# Patient Record
Sex: MALE | Race: ASIAN | NOT HISPANIC OR LATINO | Employment: UNEMPLOYED | URBAN - METROPOLITAN AREA
[De-identification: names, ages, dates, MRNs, and addresses within clinical notes are randomized per-mention and may not be internally consistent; named-entity substitution may affect disease eponyms.]

---

## 2017-07-18 ENCOUNTER — OFFICE VISIT - HEALTHEAST (OUTPATIENT)
Dept: FAMILY MEDICINE | Facility: CLINIC | Age: 52
End: 2017-07-18

## 2017-07-18 DIAGNOSIS — Z12.5 SCREENING FOR PROSTATE CANCER: ICD-10-CM

## 2017-07-18 DIAGNOSIS — Z13.0 SCREENING FOR DEFICIENCY ANEMIA: ICD-10-CM

## 2017-07-18 DIAGNOSIS — Z12.11 SCREEN FOR COLON CANCER: ICD-10-CM

## 2017-07-18 DIAGNOSIS — Z13.21 ENCOUNTER FOR VITAMIN DEFICIENCY SCREENING: ICD-10-CM

## 2017-07-18 DIAGNOSIS — Z00.00 WELL ADULT EXAM: ICD-10-CM

## 2017-07-18 DIAGNOSIS — E78.1 PURE HYPERGLYCERIDEMIA: ICD-10-CM

## 2017-07-18 DIAGNOSIS — M76.60 ACHILLES TENDONITIS: ICD-10-CM

## 2017-07-18 LAB — PSA SERPL-MCNC: 0.4 NG/ML (ref 0–3.5)

## 2017-07-19 ENCOUNTER — OFFICE VISIT - HEALTHEAST (OUTPATIENT)
Dept: FAMILY MEDICINE | Facility: CLINIC | Age: 52
End: 2017-07-19

## 2017-07-19 DIAGNOSIS — Z02.4 ENCOUNTER FOR CDL (COMMERCIAL DRIVING LICENSE) EXAM: ICD-10-CM

## 2017-07-19 ASSESSMENT — MIFFLIN-ST. JEOR: SCORE: 1601.96

## 2017-07-24 ENCOUNTER — AMBULATORY - HEALTHEAST (OUTPATIENT)
Dept: FAMILY MEDICINE | Facility: CLINIC | Age: 52
End: 2017-07-24

## 2017-07-24 DIAGNOSIS — E78.5 DYSLIPIDEMIA: ICD-10-CM

## 2017-07-25 ENCOUNTER — COMMUNICATION - HEALTHEAST (OUTPATIENT)
Dept: FAMILY MEDICINE | Facility: CLINIC | Age: 52
End: 2017-07-25

## 2017-08-01 ENCOUNTER — COMMUNICATION - HEALTHEAST (OUTPATIENT)
Dept: FAMILY MEDICINE | Facility: CLINIC | Age: 52
End: 2017-08-01

## 2017-08-01 ENCOUNTER — AMBULATORY - HEALTHEAST (OUTPATIENT)
Dept: FAMILY MEDICINE | Facility: CLINIC | Age: 52
End: 2017-08-01

## 2017-08-01 DIAGNOSIS — E78.5 DYSLIPIDEMIA: ICD-10-CM

## 2017-08-22 ENCOUNTER — OFFICE VISIT - HEALTHEAST (OUTPATIENT)
Dept: CARDIOLOGY | Facility: CLINIC | Age: 52
End: 2017-08-22

## 2017-08-22 DIAGNOSIS — E78.1 PURE HYPERGLYCERIDEMIA: ICD-10-CM

## 2017-08-22 ASSESSMENT — MIFFLIN-ST. JEOR: SCORE: 1601.96

## 2017-08-25 ENCOUNTER — HOSPITAL ENCOUNTER (OUTPATIENT)
Dept: CARDIOLOGY | Facility: HOSPITAL | Age: 52
Discharge: HOME | End: 2017-08-25
Attending: INTERNAL MEDICINE

## 2017-08-25 DIAGNOSIS — E78.1 PURE HYPERGLYCERIDEMIA: ICD-10-CM

## 2017-08-25 LAB
CV STRESS CURRENT BP HE: NORMAL
CV STRESS CURRENT HR HE: 100
CV STRESS CURRENT HR HE: 101
CV STRESS CURRENT HR HE: 103
CV STRESS CURRENT HR HE: 104
CV STRESS CURRENT HR HE: 106
CV STRESS CURRENT HR HE: 109
CV STRESS CURRENT HR HE: 112
CV STRESS CURRENT HR HE: 114
CV STRESS CURRENT HR HE: 122
CV STRESS CURRENT HR HE: 123
CV STRESS CURRENT HR HE: 125
CV STRESS CURRENT HR HE: 126
CV STRESS CURRENT HR HE: 132
CV STRESS CURRENT HR HE: 133
CV STRESS CURRENT HR HE: 147
CV STRESS CURRENT HR HE: 147
CV STRESS CURRENT HR HE: 149
CV STRESS CURRENT HR HE: 152
CV STRESS CURRENT HR HE: 157
CV STRESS CURRENT HR HE: 160
CV STRESS CURRENT HR HE: 160
CV STRESS CURRENT HR HE: 161
CV STRESS CURRENT HR HE: 73
CV STRESS CURRENT HR HE: 78
CV STRESS CURRENT HR HE: 99
CV STRESS DEVIATION TIME HE: NORMAL
CV STRESS ECHO PERCENT HR HE: NORMAL
CV STRESS EXERCISE STAGE HE: NORMAL
CV STRESS EXERCISE STAGE REACHED HE: NORMAL
CV STRESS FINAL RESTING BP HE: NORMAL
CV STRESS FINAL RESTING HR HE: 104
CV STRESS MAX HR HE: 163
CV STRESS MAX TREADMILL GRADE HE: 14
CV STRESS MAX TREADMILL SPEED HE: 3.4
CV STRESS PEAK DIA BP HE: NORMAL
CV STRESS PEAK SYS BP HE: NORMAL
CV STRESS PHASE HE: NORMAL
CV STRESS PROTOCOL HE: NORMAL
CV STRESS RESTING PT POSITION HE: NORMAL
CV STRESS RESTING PT POSITION HE: NORMAL
CV STRESS ST DEVIATION AMOUNT HE: NORMAL
CV STRESS ST DEVIATION ELEVATION HE: NORMAL
CV STRESS ST EVELATION AMOUNT HE: NORMAL
CV STRESS TEST TYPE HE: NORMAL
CV STRESS TOTAL STAGE TIME MIN 1 HE: NORMAL
STRESS ECHO BASELINE BP: NORMAL
STRESS ECHO BASELINE HR: 73
STRESS ECHO CALCULATED PERCENT HR: 96 %
STRESS ECHO LAST STRESS BP: NORMAL
STRESS ECHO LAST STRESS HR: 161
STRESS ECHO POST ESTIMATED WORKLOAD: 10.3
STRESS ECHO POST EXERCISE DUR MIN: 8
STRESS ECHO POST EXERCISE DUR SEC: 30
STRESS ECHO TARGET HR: 163.2

## 2018-03-09 ENCOUNTER — OFFICE VISIT - HEALTHEAST (OUTPATIENT)
Dept: FAMILY MEDICINE | Facility: CLINIC | Age: 53
End: 2018-03-09

## 2018-03-09 ENCOUNTER — AMBULATORY - HEALTHEAST (OUTPATIENT)
Dept: FAMILY MEDICINE | Facility: CLINIC | Age: 53
End: 2018-03-09

## 2018-03-09 DIAGNOSIS — J35.1 TONSILLAR HYPERTROPHY: ICD-10-CM

## 2018-03-09 DIAGNOSIS — K21.9 GASTROESOPHAGEAL REFLUX DISEASE WITHOUT ESOPHAGITIS: ICD-10-CM

## 2018-03-09 DIAGNOSIS — Z12.11 SCREEN FOR COLON CANCER: ICD-10-CM

## 2018-03-09 DIAGNOSIS — K14.3 COATED TONGUE: ICD-10-CM

## 2018-03-11 LAB — BACTERIA SPEC CULT: NORMAL

## 2018-03-14 ENCOUNTER — COMMUNICATION - HEALTHEAST (OUTPATIENT)
Dept: FAMILY MEDICINE | Facility: CLINIC | Age: 53
End: 2018-03-14

## 2018-03-14 LAB — BACTERIA SPEC CULT: NORMAL

## 2018-04-13 ENCOUNTER — COMMUNICATION - HEALTHEAST (OUTPATIENT)
Dept: FAMILY MEDICINE | Facility: CLINIC | Age: 53
End: 2018-04-13

## 2018-04-20 ENCOUNTER — OFFICE VISIT - HEALTHEAST (OUTPATIENT)
Dept: FAMILY MEDICINE | Facility: CLINIC | Age: 53
End: 2018-04-20

## 2018-04-20 ENCOUNTER — RECORDS - HEALTHEAST (OUTPATIENT)
Dept: ADMINISTRATIVE | Facility: OTHER | Age: 53
End: 2018-04-20

## 2018-04-20 DIAGNOSIS — R09.81 SINUS CONGESTION: ICD-10-CM

## 2018-04-20 DIAGNOSIS — Z12.11 SCREEN FOR COLON CANCER: ICD-10-CM

## 2018-04-20 DIAGNOSIS — E78.1 PURE HYPERGLYCERIDEMIA: ICD-10-CM

## 2018-05-03 ENCOUNTER — COMMUNICATION - HEALTHEAST (OUTPATIENT)
Dept: FAMILY MEDICINE | Facility: CLINIC | Age: 53
End: 2018-05-03

## 2018-06-12 ENCOUNTER — OFFICE VISIT - HEALTHEAST (OUTPATIENT)
Dept: FAMILY MEDICINE | Facility: CLINIC | Age: 53
End: 2018-06-12

## 2018-06-12 DIAGNOSIS — Z71.84 TRAVEL ADVICE ENCOUNTER: ICD-10-CM

## 2018-06-12 DIAGNOSIS — E78.1 PURE HYPERGLYCERIDEMIA: ICD-10-CM

## 2018-06-12 DIAGNOSIS — Z12.5 SCREENING FOR PROSTATE CANCER: ICD-10-CM

## 2018-06-12 LAB
ALBUMIN SERPL-MCNC: 4.3 G/DL (ref 3.5–5)
ALP SERPL-CCNC: 126 U/L (ref 45–120)
ALT SERPL W P-5'-P-CCNC: 30 U/L (ref 0–45)
ANION GAP SERPL CALCULATED.3IONS-SCNC: 12 MMOL/L (ref 5–18)
AST SERPL W P-5'-P-CCNC: 23 U/L (ref 0–40)
BILIRUB SERPL-MCNC: 1 MG/DL (ref 0–1)
BUN SERPL-MCNC: 14 MG/DL (ref 8–22)
CALCIUM SERPL-MCNC: 10.2 MG/DL (ref 8.5–10.5)
CHLORIDE BLD-SCNC: 103 MMOL/L (ref 98–107)
CO2 SERPL-SCNC: 23 MMOL/L (ref 22–31)
CREAT SERPL-MCNC: 0.89 MG/DL (ref 0.7–1.3)
GFR SERPL CREATININE-BSD FRML MDRD: >60 ML/MIN/1.73M2
GLUCOSE BLD-MCNC: 96 MG/DL (ref 70–125)
POTASSIUM BLD-SCNC: 4.5 MMOL/L (ref 3.5–5)
PROT SERPL-MCNC: 7.9 G/DL (ref 6–8)
PSA SERPL-MCNC: 0.4 NG/ML (ref 0–3.5)
SODIUM SERPL-SCNC: 138 MMOL/L (ref 136–145)
THYROID PEROXIDASE ANTIBODIES - HISTORICAL: <3 IU/ML (ref 0–5.6)
TSH SERPL DL<=0.005 MIU/L-ACNC: 1.18 UIU/ML (ref 0.3–5)

## 2018-06-12 RX ORDER — LOPERAMIDE HYDROCHLORIDE 2 MG/1
2 TABLET ORAL 4 TIMES DAILY PRN
Qty: 30 TABLET | Refills: 0 | Status: SHIPPED | OUTPATIENT
Start: 2018-06-12 | End: 2021-08-19

## 2018-06-12 ASSESSMENT — MIFFLIN-ST. JEOR: SCORE: 1583.82

## 2018-06-13 LAB — 25(OH)D3 SERPL-MCNC: 11.4 NG/ML (ref 30–80)

## 2018-06-16 LAB
CHOLEST SERPL-MCNC: 237 MG/DL
HDL SERPL QN: ABNORMAL NM
HDL SERPL-SCNC: ABNORMAL UMOL/L
HDLC SERPL-MCNC: 30 MG/DL (ref 40–59)
HLD.LARGE SERPL-SCNC: ABNORMAL UMOL/L
LDL SERPL QN: ABNORMAL NM
LDL SERPL-SCNC: ABNORMAL NMOL/L
LDL SMALL SERPL-SCNC: ABNORMAL NMOL/L
LDLC SERPL CALC-MCNC: ABNORMAL MG/DL
PATHOLOGY STUDY: ABNORMAL
TRIGL SERPL-MCNC: 857 MG/DL (ref 30–149)
VLDL LARGE SERPL-SCNC: ABNORMAL NMOL/L
VLDL SERPL QN: ABNORMAL NM

## 2018-06-21 ENCOUNTER — COMMUNICATION - HEALTHEAST (OUTPATIENT)
Dept: FAMILY MEDICINE | Facility: CLINIC | Age: 53
End: 2018-06-21

## 2018-06-21 ENCOUNTER — AMBULATORY - HEALTHEAST (OUTPATIENT)
Dept: FAMILY MEDICINE | Facility: CLINIC | Age: 53
End: 2018-06-21

## 2018-06-24 ENCOUNTER — COMMUNICATION - HEALTHEAST (OUTPATIENT)
Dept: FAMILY MEDICINE | Facility: CLINIC | Age: 53
End: 2018-06-24

## 2018-06-25 ENCOUNTER — AMBULATORY - HEALTHEAST (OUTPATIENT)
Dept: FAMILY MEDICINE | Facility: CLINIC | Age: 53
End: 2018-06-25

## 2018-10-19 ENCOUNTER — AMBULATORY - HEALTHEAST (OUTPATIENT)
Dept: FAMILY MEDICINE | Facility: CLINIC | Age: 53
End: 2018-10-19

## 2018-10-19 DIAGNOSIS — E78.5 DYSLIPIDEMIA: ICD-10-CM

## 2018-12-04 ENCOUNTER — OFFICE VISIT - HEALTHEAST (OUTPATIENT)
Dept: FAMILY MEDICINE | Facility: CLINIC | Age: 53
End: 2018-12-04

## 2018-12-04 ENCOUNTER — AMBULATORY - HEALTHEAST (OUTPATIENT)
Dept: FAMILY MEDICINE | Facility: CLINIC | Age: 53
End: 2018-12-04

## 2018-12-04 DIAGNOSIS — M54.6 CHRONIC MIDLINE THORACIC BACK PAIN: ICD-10-CM

## 2018-12-04 DIAGNOSIS — M54.42 BILATERAL LOW BACK PAIN WITH LEFT-SIDED SCIATICA, UNSPECIFIED CHRONICITY: ICD-10-CM

## 2018-12-04 DIAGNOSIS — E78.5 DYSLIPIDEMIA: ICD-10-CM

## 2018-12-04 DIAGNOSIS — G89.29 CHRONIC MIDLINE THORACIC BACK PAIN: ICD-10-CM

## 2018-12-04 DIAGNOSIS — M79.18 MYOFASCIAL MUSCLE PAIN: ICD-10-CM

## 2018-12-04 LAB
CHOLEST SERPL-MCNC: 234 MG/DL
FASTING STATUS PATIENT QL REPORTED: ABNORMAL
HDLC SERPL-MCNC: 36 MG/DL
LDLC SERPL CALC-MCNC: 95 MG/DL
LDLC SERPL CALC-MCNC: ABNORMAL MG/DL
TRIGL SERPL-MCNC: 725 MG/DL

## 2018-12-04 RX ORDER — OMEGA-3-ACID ETHYL ESTERS 1 G/1
2 CAPSULE, LIQUID FILLED ORAL 2 TIMES DAILY
Qty: 120 CAPSULE | Refills: 12 | Status: SHIPPED | OUTPATIENT
Start: 2018-12-04 | End: 2021-08-19

## 2018-12-05 ENCOUNTER — COMMUNICATION - HEALTHEAST (OUTPATIENT)
Dept: FAMILY MEDICINE | Facility: CLINIC | Age: 53
End: 2018-12-05

## 2018-12-17 ENCOUNTER — AMBULATORY - HEALTHEAST (OUTPATIENT)
Dept: FAMILY MEDICINE | Facility: CLINIC | Age: 53
End: 2018-12-17

## 2018-12-17 DIAGNOSIS — E78.5 COMPLEX DYSLIPIDEMIA: ICD-10-CM

## 2018-12-17 DIAGNOSIS — M54.16 LUMBAR RADICULOPATHY: ICD-10-CM

## 2018-12-17 DIAGNOSIS — M43.26 ANKYLOSIS OF LUMBAR SPINE: ICD-10-CM

## 2018-12-17 DIAGNOSIS — D49.2 NERVE SHEATH TUMOR: ICD-10-CM

## 2018-12-18 ENCOUNTER — COMMUNICATION - HEALTHEAST (OUTPATIENT)
Dept: FAMILY MEDICINE | Facility: CLINIC | Age: 53
End: 2018-12-18

## 2018-12-18 DIAGNOSIS — D49.2 NERVE SHEATH TUMOR: ICD-10-CM

## 2018-12-18 DIAGNOSIS — E78.2 MIXED HYPERLIPIDEMIA: ICD-10-CM

## 2018-12-26 ENCOUNTER — HOSPITAL ENCOUNTER (OUTPATIENT)
Dept: PHYSICAL MEDICINE AND REHAB | Facility: CLINIC | Age: 53
Discharge: HOME | End: 2018-12-26
Attending: FAMILY MEDICINE

## 2018-12-26 DIAGNOSIS — M51.369 DDD (DEGENERATIVE DISC DISEASE), LUMBAR: ICD-10-CM

## 2018-12-26 DIAGNOSIS — M54.50 LUMBAR SPINE PAIN: ICD-10-CM

## 2018-12-26 DIAGNOSIS — M79.18 MYOFASCIAL PAIN: ICD-10-CM

## 2018-12-26 RX ORDER — NABUMETONE 500 MG/1
500 TABLET, FILM COATED ORAL 2 TIMES DAILY PRN
Qty: 60 TABLET | Refills: 1 | Status: SHIPPED | OUTPATIENT
Start: 2018-12-26 | End: 2021-08-19

## 2018-12-26 ASSESSMENT — MIFFLIN-ST. JEOR: SCORE: 1570.66

## 2018-12-28 ENCOUNTER — HOSPITAL ENCOUNTER (OUTPATIENT)
Dept: CT IMAGING | Facility: CLINIC | Age: 53
Discharge: HOME | End: 2018-12-28
Attending: FAMILY MEDICINE

## 2018-12-28 DIAGNOSIS — E78.5 COMPLEX DYSLIPIDEMIA: ICD-10-CM

## 2018-12-28 LAB
CV CALCIUM SCORE AGATSTON LM: 0
CV CALCIUM SCORING AGATSON LAD: 0
CV CALCIUM SCORING AGATSTON CX: 0
CV CALCIUM SCORING AGATSTON RCA: 1
CV CALCIUM SCORING AGATSTON TOTAL: 1

## 2019-01-04 ENCOUNTER — COMMUNICATION - HEALTHEAST (OUTPATIENT)
Dept: FAMILY MEDICINE | Facility: CLINIC | Age: 54
End: 2019-01-04

## 2019-02-01 ENCOUNTER — OFFICE VISIT - HEALTHEAST (OUTPATIENT)
Dept: CARDIOLOGY | Facility: CLINIC | Age: 54
End: 2019-02-01

## 2019-02-01 ENCOUNTER — HOSPITAL ENCOUNTER (OUTPATIENT)
Dept: PHYSICAL MEDICINE AND REHAB | Facility: CLINIC | Age: 54
Discharge: HOME | End: 2019-02-01
Attending: PHYSICAL MEDICINE & REHABILITATION

## 2019-02-01 ENCOUNTER — COMMUNICATION - HEALTHEAST (OUTPATIENT)
Dept: PHYSICAL MEDICINE AND REHAB | Facility: CLINIC | Age: 54
End: 2019-02-01

## 2019-02-01 DIAGNOSIS — M54.50 CHRONIC MIDLINE LOW BACK PAIN WITHOUT SCIATICA: ICD-10-CM

## 2019-02-01 DIAGNOSIS — M54.50 LUMBAR SPINE PAIN: ICD-10-CM

## 2019-02-01 DIAGNOSIS — G89.29 CHRONIC MIDLINE LOW BACK PAIN WITHOUT SCIATICA: ICD-10-CM

## 2019-02-01 DIAGNOSIS — E78.1 PURE HYPERGLYCERIDEMIA: ICD-10-CM

## 2019-02-01 DIAGNOSIS — M47.816 ARTHROPATHY OF LUMBAR FACET JOINT: ICD-10-CM

## 2019-02-01 DIAGNOSIS — M43.26 ANKYLOSIS OF LUMBAR SPINE: ICD-10-CM

## 2019-02-01 DIAGNOSIS — M54.16 LUMBAR RADICULAR PAIN: ICD-10-CM

## 2019-02-01 DIAGNOSIS — M79.18 MYOFASCIAL PAIN: ICD-10-CM

## 2019-02-01 ASSESSMENT — MIFFLIN-ST. JEOR
SCORE: 1592.89
SCORE: 1613.29

## 2019-02-04 ENCOUNTER — COMMUNICATION - HEALTHEAST (OUTPATIENT)
Dept: CARDIOLOGY | Facility: CLINIC | Age: 54
End: 2019-02-04

## 2019-02-04 DIAGNOSIS — E78.1 ESSENTIAL HYPERTRIGLYCERIDEMIA: ICD-10-CM

## 2019-02-08 RX ORDER — FENOFIBRATE 145 MG/1
145 TABLET, COATED ORAL DAILY
Qty: 90 TABLET | Refills: 3 | Status: SHIPPED | OUTPATIENT
Start: 2019-02-08 | End: 2021-08-19

## 2020-05-15 ENCOUNTER — VIRTUAL VISIT (OUTPATIENT)
Dept: FAMILY MEDICINE | Facility: OTHER | Age: 55
End: 2020-05-15

## 2020-05-17 ENCOUNTER — VIRTUAL VISIT (OUTPATIENT)
Dept: FAMILY MEDICINE | Facility: OTHER | Age: 55
End: 2020-05-17

## 2020-05-17 ENCOUNTER — AMBULATORY - HEALTHEAST (OUTPATIENT)
Dept: FAMILY MEDICINE | Facility: CLINIC | Age: 55
End: 2020-05-17

## 2020-05-17 DIAGNOSIS — Z20.822 SUSPECTED COVID-19 VIRUS INFECTION: ICD-10-CM

## 2020-05-17 NOTE — PROGRESS NOTES
"Date: 05/15/2020 16:58:12  Clinician: Kate Madrigal  Clinician NPI: [id number]  Patient: Roya Peres  Patient : 1965  Patient Address: [address] [place] [address] [address], [place] [address]  Patient Phone: [phone]  Visit Protocol: URI  Patient Summary:  Roya is a 54 year old ( : 1965 ) male who initiated a Visit for COVID-19 (Coronavirus) evaluation and screening. When asked the question \"Please sign me up to receive news, health information and promotions. \", Roya responded \"No\".    Roya states his symptoms started gradually 5-6 days ago. After his symptoms started, they improved and then got worse again.   His symptoms consist of ageusia, chills, a headache, malaise, myalgia, and anosmia. Roya also feels feverish but was unable to measure his temperature.   Symptom details   Headache: He states the headache is moderate (4-6 on a 10 point pain scale).    Roya denies having cough, nasal congestion, vomiting, nausea, teeth pain, diarrhea, facial pain or pressure, ear pain, rhinitis, sore throat, wheezing, and enlarged lymph nodes. He also denies having a sinus infection within the past year, taking antibiotic medication for the symptoms, and having recent facial or sinus surgery in the past 60 days. He is not experiencing dyspnea.   Precipitating events  He has not recently been exposed to someone with influenza. Roya has not been in close contact with any high risk individuals.   Pertinent COVID-19 (Coronavirus) information  In the past 14 days, Roya has not worked in a congregate living setting.   He does not work or volunteer as healthcare worker or a  and does not work or volunteer in a healthcare facility.   Roya also has not lived in a congregate living setting in the past 14 days. He does not live with a healthcare worker.   Roya has not had a close contact with a laboratory-confirmed COVID-19 patient within 14 days of symptom onset.   Pertinent medical history  Roya does not need a return to " work/school note.   Weight: 174 lbs   RUST does not smoke or use smokeless tobacco.   Weight: 174 lbs    MEDICATIONS: No current medications, ALLERGIES: NKDA  Clinician Response:  Dear [facility],   Your symptoms show that you may have coronavirus (COVID-19). This illness can cause fever, cough and trouble breathing. Many people get a mild case and get better on their own. Some people can get very sick.  Will I be tested for COVID-19?  Because we have limited testing supplies we are not testing everyone if they are low risk. We are testing if:   You are very ill. For example, you're on chemotherapy, dialysis or home hospice care. (Contact your specialty clinic or program.)   You live in a nursing home or other long-term care facility. (Talk to your nurse manager or medical director.)   You're a health care worker. ([facility] employees Contact our employee health office for testing.)   We are performing limited curbside testing for healthcare/first responders and people with medical problems that put them at increased risk. It does not appear by the [facility] information you submitted that you meet any of these criteria. If there are medical problems that we did not know about, please repeat an [facility] visit and let us know what medical conditions you have.  While you do not qualify for testing via [facility], there are other locations that are offering testing for COVID-19. Please visit this website&nbsp;https://[place].gov/covid19/for-[place]/if-sick/testing-locations/index.jsp&nbsp;to find a location near you if you are interested in being tested.  How can I protect others?  Without a test, we can't know for sure that you have COVID-19. For safety, it's very important to follow these rules.  First, stay home and away from others (self-isolate) until:   You've had no fever---and no medicine that reduces fever---for 3 full days (72 hours). And...    Your other symptoms have gotten better. For example, your cough or breathing  has improved. And...   At least 10 days have passed since your symptoms started.   During this time:   Don't go to work, school or anywhere else.    Stay away from others in your home. No hugging, kissing or shaking hands.   Don't let anyone visit.   Cover your mouth and nose with a mask, tissue or wash cloth to avoid spreading germs.   Wash your hands and face often. Use soap and water.   How can I take care of myself?  1.Take Tylenol (acetaminophen) for fever or pain. If you have liver or kidney problems, ask your family doctor if it's okay to take Tylenol.   Adults can take either:    650 mg (two 325 mg pills) every 4 to 6 hours, or...   1,000 mg (two 500 mg pills) every 8 hours as needed.    Note: Don't take more than 3,000 mg in one day.  For children, check the Tylenol bottle for the right dose. The dose is based on the child's age or weight.   2.If you have other health problems (like cancer, heart failure, an organ transplant or severe kidney disease): Call your specialty clinic if you don't feel better in the next 2 days.  3.Know when to call 911: If your breathing is so bad that it keeps you from doing normal activities, call 911 or go to the emergency room. Tell them that you've been staying home and may have COVID-19.  4.Sign up for [employer]. We know it's scary to hear that you might have COVID-19. We want to track your symptoms to make sure you're okay over the next 2 weeks. Please look for an email from [employer]---this is a free, online program that we'll use to keep in touch. To sign up, follow the link in the email. Learn more at [email or web address].  Where can I get more information?  To learn more about COVID-19 and how to care for yourself at home, please visit the CDC website at https://www.cdc.gov/coronavirus/2019-ncov/about/steps-when-sick.html.  For more options for care at [facility], please visit our website at [email or web address]   If you  are interested in becoming part of a [facility] clinic trial related to COVID19 please go to [email or web address].[email or web address]/[facility]-clinical-trials for information, if you qualify.     Diagnosis: Myalgia  Diagnosis ICD: M79.1

## 2020-05-17 NOTE — PROGRESS NOTES
"Date: 2020 13:42:22  Clinician: Too Muñiz  Clinician NPI: [id number]  Patient: Roya Peres  Patient : 1965  Patient Address: [address] [place] [address] [address], [place] [address]  Patient Phone: [phone]  Visit Protocol: URI  Patient Summary:  Roya is a 54 year old ( : 1965 ) male who initiated a Visit for COVID-19 (Coronavirus) evaluation and screening. When asked the question \"Please sign me up to receive news, health information and promotions. \", Roya responded \"No\".    Roya states his symptoms started gradually 7-9 days ago. After his symptoms started, they improved and then got worse again.   His symptoms consist of ageusia, chills, a sore throat, a cough, a headache, malaise, myalgia, and anosmia. Roya also feels feverish but was unable to measure his temperature.   Symptom details     Cough: Roya coughs a few times an hour and his cough is not more bothersome at night. Phlegm comes into his throat when he coughs. He does not believe his cough is caused by post-nasal drip. The color of the phlegm is clear, white, and yellow.     Sore throat: Roya reports having mild throat pain (1-3 on a 10 point pain scale), does not have exudate on his tonsils, and can swallow liquids. The lymph nodes in his neck are not enlarged. A rash has not appeared on the skin since the sore throat started.     Headache: He states the headache is moderate (4-6 on a 10 point pain scale).      Roya denies having nausea, teeth pain, diarrhea, facial pain or pressure, wheezing, enlarged lymph nodes, nasal congestion, vomiting, rhinitis, and ear pain. He also denies having recent facial or sinus surgery in the past 60 days, taking antibiotic medication for the symptoms, and having a sinus infection within the past year. He is not experiencing dyspnea.   Precipitating events  Within the past week, Roya has not been exposed to someone with strep throat. He has not recently been exposed to someone with influenza. Roya has not been in " close contact with any high risk individuals.   Pertinent COVID-19 (Coronavirus) information  In the past 14 days, Roya has not worked in a congregate living setting.   He does not work or volunteer as healthcare worker or a  and does not work or volunteer in a healthcare facility.   Roya also has not lived in a congregate living setting in the past 14 days. He does not live with a healthcare worker.   Roya has not had a close contact with a laboratory-confirmed COVID-19 patient within 14 days of symptom onset.   Pertinent medical history  Roya does not need a return to work/school note.   Weight: 174 lbs   Roya does not smoke or use smokeless tobacco.   Additional information as reported by the patient (free text): breathing through the nose hurts/stings, the air going through the nose sends a deep inner pain and chill into the front of the head and lower back of the head. No appetite and no energy to do anything.   Weight: 174 lbs    MEDICATIONS: No current medications, ALLERGIES: NKDA  Clinician Response:  Dear [facility],   Dear [facility]  Your symptoms show that you may have coronavirus (COVID-19). This illness can cause fever, cough and trouble breathing. Many people get a mild case and get better on their own. Some people can get very sick.  Will I be tested for COVID-19?  Because we have limited testing supplies we are not testing everyone if they are low risk. We are testing if:   You are very ill. For example, you're on chemotherapy, dialysis or home hospice care. (Contact your specialty clinic or program.)   You live in a nursing home or other long-term care facility. (Talk to your nurse manager or medical director.)   You're a health care worker. ([facility] employees Contact our employee health office for testing.)   We are performing limited curbside testing for healthcare/first responders and people with medical problems that put them at increased risk. It does not appear by the boosk information  you submitted that you meet any of these criteria. If there are medical problems that we did not know about, please repeat an [facility] visit and let us know what medical conditions you have..  How can I protect others?  Without a test, we can't know for sure that you have COVID-19. For safety, it's very important to follow these rules.  First, stay home and away from others (self-isolate) until:   You've had no fever---and no medicine that reduces fever---for 3 full days (72 hours). And...    Your other symptoms have gotten better. For example, your cough or breathing has improved. And...   At least 10 days have passed since your symptoms started.   During this time:   Don't go to work, school or anywhere else.    Stay away from others in your home. No hugging, kissing or shaking hands.   Don't let anyone visit.   Cover your mouth and nose with a mask, tissue or wash cloth to avoid spreading germs.   Wash your hands and face often. Use soap and water.   How can I take care of myself?  1.Take Tylenol (acetaminophen) for fever or pain. If you have liver or kidney problems, ask your family doctor if it's okay to take Tylenol.   Adults can take either:    650 mg (two 325 mg pills) every 4 to 6 hours, or...   1,000 mg (two 500 mg pills) every 8 hours as needed.    Note: Don't take more than 3,000 mg in one day.  For children, check the Tylenol bottle for the right dose. The dose is based on the child's age or weight.   2.If you have other health problems (like cancer, heart failure, an organ transplant or severe kidney disease): Call your specialty clinic if you don't feel better in the next 2 days.  3.Know when to call 911: If your breathing is so bad that it keeps you from doing normal activities, call 911 or go to the emergency room. Tell them that you've been staying home and may have COVID-19.  4.Sign up for [facility]. We know it's scary to hear that you might have COVID-19. We want to track your symptoms to make  sure you're okay over the next 2 weeks. Please look for an email from [employer]---this is a free, online program that we'll use to keep in touch. To sign up, follow the link in the email. Learn more at [email or web address].  Where can I get more information?  To learn more about COVID-19 and how to care for yourself at home, please visit the CDC website at https://www.cdc.gov/coronavirus/2019-ncov/about/steps-when-sick.html.  For more options for care at [facility], please visit our website at [email or web address]   If you are interested in becoming part of a [facility] clinic trial related to COVID19 please go to [email or web address]-trials for information, if you qualify.     Diagnosis: Nasal congestion  Diagnosis ICD: R09.81

## 2020-05-18 ENCOUNTER — OFFICE VISIT - HEALTHEAST (OUTPATIENT)
Dept: FAMILY MEDICINE | Facility: CLINIC | Age: 55
End: 2020-05-18

## 2020-05-18 DIAGNOSIS — Z20.822 SUSPECTED COVID-19 VIRUS INFECTION: ICD-10-CM

## 2020-05-20 ENCOUNTER — COMMUNICATION - HEALTHEAST (OUTPATIENT)
Dept: FAMILY MEDICINE | Facility: CLINIC | Age: 55
End: 2020-05-20

## 2020-12-18 ENCOUNTER — VIRTUAL VISIT (OUTPATIENT)
Dept: FAMILY MEDICINE | Facility: OTHER | Age: 55
End: 2020-12-18

## 2020-12-18 NOTE — PROGRESS NOTES
"Date: 2020 12:36:14  Clinician: Too Muñiz  Clinician NPI: [id number]  Patient: Roya Peres  Patient : 1965  Patient Address: [address], [place] [address]  Patient Phone: [phone]  Visit Protocol: URI  Patient Summary:  Roya is a 55 year old ( : 1965 ) male who initiated a [facility] Visit for COVID-19 (Coronavirus) evaluation and screening. When asked the question \"Please sign me up to receive news, health information and promotions. \", Roya responded \"No\".    When asked when his symptoms started, Roya reported that he does not have any symptoms.   He denies having recent facial or sinus surgery in the past 60 days and taking antibiotic medication in the past month.    Pertinent COVID-19 (Coronavirus) information  Roya does not work or volunteer as healthcare worker or a . In the past 14 days, Roya has not worked or volunteered at a healthcare facility or group living setting.   In the past 14 days, he also has not lived in a congregate living setting.   Roya has not had a close contact with a laboratory-confirmed COVID-19 patient within the last 14 days.    Roya has been tested for COVID-19.      Date(s) of his COVID-19 test as reported by the patient (free text): 2020       Result of COVID-19 test as reported by the patient (free text): Forgot       Type of test as reported by the patient (free text): Nasal        Pertinent medical history  He has not been told by his provider to avoid NSAIDs.   Roya does not have diabetes. He denies having immunosuppressive conditions (e.g., chemotherapy, HIV, organ transplant, long-term use of steroids or other immunosuppressive medications, splenectomy). He denies having congestive heart failure and severe COPD. He does not have asthma.   Roya needs a return to work/school note.   Roya does not smoke or use smokeless tobacco.   Additional information as reported by the patient (free text): Started sneezing and itching about a week " ago. No known allergies.   Weight: 167 lbs    MEDICATIONS: No current medications, ALLERGIES: NKDA  Clinician Response:  Dear [facility],   Your symptoms show that you may have coronavirus (COVID-19). This illness can cause fever, cough and trouble breathing. Many people get a mild case and get better on their own. Some people can get very sick.  What should I do?  We would like to test you for this virus.   1. Please call [phone] to schedule your visit. Explain that you were referred by [facility] to have a COVID-19 test. Be ready to share your [facility] visit ID number.  * If you need to schedule in [facility] please call [phone] or for [employer] employees please call [phone].  * If you need to schedule in the [place] area please call [phone]. [employer] employees call [phone].  The following will serve as your written order for this COVID Test, ordered by me, for the indication of suspected COVID [Z20.828]: The test will be ordered in [employer], our electronic health record, after you are scheduled. It will show as ordered and authorized by Pedro Pablo Frank MD.  Order: COVID-19 (Coronavirus) PCR for SYMPTOMATIC testing from [facility].   2. When it's time for your COVID test:  Stay at least 6 feet away from others. (If someone will drive you to your test, stay in the backseat, as far away from the  as you can.)   Cover your mouth and nose with a mask, tissue or washcloth.  Go straight to the testing site. Don't make any stops on the way there or back.      3.Starting now: Stay home and away from others (self-isolate) until:   You've had no fever---and no medicine that reduces fever---for one full day (24 hours). And...   Your other symptoms have gotten better. For example, your cough or breathing has improved. And...   At least 10 days have passed since your symptoms started.       During this time, don't leave the house except for testing or medical care.   Stay in your own room, even for meals. Use your own  "bathroom if you can.   Stay away from others in your home. No hugging, kissing or shaking hands. No visitors.  Don't go to work, school or anywhere else.    Clean \"high touch\" surfaces often (doorknobs, counters, handles, etc.). Use a household cleaning spray or wipes. You'll find a full list of  on the EPA website: www.epa.gov/pesticide-registration/list-n-disinfectants-use-against-sars-cov-2.   Cover your mouth and nose with a mask, tissue or washcloth to avoid spreading germs.  Wash your hands and face often. Use soap and water.  Caregivers in these groups are at risk for severe illness due to COVID-19:  o People 65 years and older  o People who live in a nursing home or long-term care facility  o People with chronic disease (lung, heart, cancer, diabetes, kidney, liver, immunologic)  o People who have a weakened immune system, including those who:   Are in cancer treatment  Take medicine that weakens the immune system, such as corticosteroids  Had a bone marrow or organ transplant  Have an immune deficiency  Have poorly controlled HIV or AIDS  Are obese (body mass index of 40 or higher)  Smoke regularly   o Caregivers should wear gloves while washing dishes, handling laundry and cleaning bedrooms and bathrooms.  o Use caution when washing and drying laundry: Don't shake dirty laundry, and use the warmest water setting that you can.  o For more tips, go to www.cdc.gov/coronavirus/2019-ncov/downloads/10Things.pdf.       How can I take care of myself?   Get lots of rest. Drink extra fluids (unless a doctor has told you not to).   Take Tylenol (acetaminophen) for fever or pain. If you have liver or kidney problems, ask your family doctor if it's okay to take Tylenol.   Adults can take either:    650 mg (two 325 mg pills) every 4 to 6 hours, or...   1,000 mg (two 500 mg pills) every 8 hours as needed.    Note: Don't take more than 3,000 mg in one day. Acetaminophen is found in many medicines (both prescribed " and over-the-counter medicines). Read all labels to be sure you don't take too much.   For children, check the Tylenol bottle for the right dose. The dose is based on the child's age or weight.    If you have other health problems (like cancer, heart failure, an organ transplant or severe kidney disease): Call your specialty clinic if you don't feel better in the next 2 days.       Know when to call 911. Emergency warning signs include:    Trouble breathing or shortness of breath Pain or pressure in the chest that doesn't go away Feeling confused like you haven't felt before, or not being able to wake up Bluish-colored lips or face.  Where can I get more information?    [employer] -- About COVID-19: [email or web address]   CDC -- What to Do If You're Sick: www.cdc.gov/coronavirus/2019-ncov/about/steps-when-sick.html   CDC -- Ending Home Isolation: www.cdc.gov/coronavirus/2019-ncov/hcp/disposition-in-home-patients.html   CDC -- Caring for Someone: www.cdc.gov/coronavirus/2019-ncov/if-you-are-sick/care-for-someone.html   [facility] -- Interim Guidance for Hospital Discharge to Home: www.health.[facility].[place].us/diseases/coronavirus/hcp/hospdischarge.pdf   [facility] clinical trials (COVID-19 research studies): clinicalaffairs.[facility]-clinical-trials    Below are the COVID-19 hotlines at the [facility] of [facility] ([facility]). Interpreters are available.    For health questions: Call [phone] or 1-602.212.1791 (7 a.m. to 7 p.m.) For questions about schools and childcare: Call [phone] or 1-842.107.1336 (7 a.m. to 7 p.m.)    Diagnosis: Contact with and (suspected) exposure to other viral communicable diseases  Diagnosis ICD: Z20.828

## 2021-02-15 ENCOUNTER — COMMUNICATION - HEALTHEAST (OUTPATIENT)
Dept: FAMILY MEDICINE | Facility: CLINIC | Age: 56
End: 2021-02-15

## 2021-05-31 ENCOUNTER — RECORDS - HEALTHEAST (OUTPATIENT)
Dept: ADMINISTRATIVE | Facility: CLINIC | Age: 56
End: 2021-05-31

## 2021-05-31 VITALS — WEIGHT: 176 LBS | BODY MASS INDEX: 27.62 KG/M2 | BODY MASS INDEX: 27.01 KG/M2 | WEIGHT: 173.75 LBS | HEIGHT: 67 IN

## 2021-05-31 VITALS — BODY MASS INDEX: 27.62 KG/M2 | HEIGHT: 67 IN | WEIGHT: 176 LBS

## 2021-06-01 VITALS — WEIGHT: 173 LBS | BODY MASS INDEX: 27.1 KG/M2

## 2021-06-01 VITALS — HEIGHT: 67 IN | BODY MASS INDEX: 27 KG/M2 | WEIGHT: 172 LBS

## 2021-06-01 VITALS — WEIGHT: 176 LBS | BODY MASS INDEX: 27.57 KG/M2

## 2021-06-02 ENCOUNTER — RECORDS - HEALTHEAST (OUTPATIENT)
Dept: ADMINISTRATIVE | Facility: CLINIC | Age: 56
End: 2021-06-02

## 2021-06-02 VITALS — BODY MASS INDEX: 26.9 KG/M2 | WEIGHT: 171.75 LBS

## 2021-06-02 VITALS — WEIGHT: 169.1 LBS | BODY MASS INDEX: 26.54 KG/M2 | HEIGHT: 67 IN

## 2021-06-02 VITALS — BODY MASS INDEX: 26.52 KG/M2 | HEIGHT: 68 IN | WEIGHT: 175 LBS

## 2021-06-02 VITALS — WEIGHT: 174 LBS | BODY MASS INDEX: 27.31 KG/M2 | HEIGHT: 67 IN

## 2021-06-08 NOTE — TELEPHONE ENCOUNTER
Coronavirus (COVID-19) Notification    Reason for call  Notify of POSITIVE  COVID-19 lab result, assess symptoms,  review [facility] recommendations    Lab Result   Lab test for 2019-nCoV rRt-PCR or SARS-COV-2 PCR  Oropharyngeal AND/OR nasopharyngeal swabs were POSITIVE for 2019-nCoV RNA [OR] SARS-COV-2 RNA (COVID-19) RNA     We have been unable to reach Patient by phone at this time to notify of their Positive COVID-19 result.  Left voicemail message requesting a call back between 8 am to 6:30 p.m. to [phone] [facility] for results.   (Weekends, this line is available from 10A to 6:30P)     POSITIVE COVID-19 Letter sent.    [Name]  Sarah Schlatter RN

## 2021-06-08 NOTE — TELEPHONE ENCOUNTER
Coronavirus (COVID-19) Notification    Patient  [facility] TAWANA Peres     Reason for call  Notify of Positive Coronavirus (COVID-19) lab results, assess symptoms,  review [facility] recommendations    Lab Result    Lab test:  2019-nCoV rRt-PCR or SARS-CoV-2 PCR    Oropharyngeal AND/OR nasopharyngeal swabs is POSITIVE for 2019-nCoV RNA/SARS-COV-2 PCR (COVID-19 virus)    RN Recommendations/Instructions per [facility] Coronavirus COVID-19 recommendations    Brief introduction script  Hi, My name is NATALIE Story and I am calling on behalf of [employer] [place].  We were notified that your Coronavirus test (COVID-19) for was POSITIVE for the virus.  I have some information to relay to you but first I wanted to mention that the [place] Dept of Health will be contacting you shortly [it's possible MD already called Patient] to talk to you more about how you are feeling and other people you have had contact with who might now also have the virus.  Also, [facility] is Partnering with the [facility] for Covid-19 research, you may be contacted directly by research staff.    Assessment (Inquire about Patient's current symptoms)  Tiredness and headache and cough. Denies shortness of breath or fever.  Symptoms have been going on for about 2 weeks.    During our phone conversation, pt brings up concerns about previous experiences with ong interpreters who have been fluent in English but not Hmong and how it is very difficult to understand at times. He understands English quite well but is concerned about others who rely on the  to give them all the information they need about their health care. Pt is happy with our phone conversation and I notified him that I would contact the language line to express his concern. I spoke with Sophia at [facility]  [employer] and notified her of pt's concern.    If at time of call, Patients symptoms hare worsened, the Patient should contact 911 or have someone drive them  to Emergency Dept promptly:      If Patient calling 911, inform 911 personal that you have tested positive for the Coronavirus (COVID-19).  Place mask on and await 911 to arrive.    If Emergency Dept, If possible, please have another adult drive you to the Emergency Dept but you need to wear mask when in contact with other people.      Review information with Patient    Since you tested POSITIVE for the COVID-19 virus, it is important that you protect others from being exposed and infected with this virus.    [For safety, it's very important to follow these rules.]    First, stay home and away from others (self-isolate) until:    You've had no fever--and no medicine that reduces fever--for 3 full days (72 hours). And      Your other symptoms have gotten better. For example, your cough or breathing has improved. And     At least 10 days have passed since your symptoms started.    During this time:    Stay in your own room (and use your own bathroom), if you can.    Stay away from others in your home. No hugging, kissing or shaking hands.    Don't let anyone visit.    Don't go to work, school or anywhere else.     Clean  high touch  surfaces often (doorknobs, counters, handles, etc.). Use a household cleaning spray or wipes.    Cover your mouth and nose with a mask, tissue or washcloth to avoid spreading germs.    Wash your hands and face often with soap and water.    You should not go back to work until you meet the guidelines above for ending your home isolation. You should meet these along with any other guidelines that your employer has.  Employers: This document serves as formal notice of your employee's medical guidelines for going back to work. They must meet the above guidelines before going back to work in person.    How can I take care of myself?  1. Get lots of rest. Drink extra fluids (unless a doctor has told you not to).    2. Take Tylenol (acetaminophen) for fever or pain. If you have liver or kidney  problems, ask your family doctor if it's okay to take Tylenol.     Take either:     650 mg (two 325 mg pills) every 4 to 6 hours, or     1,000 mg (two 500 mg pills) every 8 hours as needed.     Note: Don't take more than 3,000 mg in one day. Acetaminophen is found in many medicines (both prescribed and over-the-counter medicines). Read all labels to be sure you don't take too much.  For children, check the Tylenol bottle for the right dose. The dose is based on the child's age or weight.  3. If you have other health problems (like cancer, heart failure, an organ transplant or severe kidney disease): Call your specialty clinic if you don't feel better in the next 2 days.    4. Know when to call 911: If your breathing is so bad that it keeps you from doing normal activities, call 911 or go to the emergency room. Tell them that you've been staying home and may have COVID-19.  5. Sign up for [employer]. We know it's scary to hear that you have COVID-19. We want to track your symptoms to make sure you're okay over the next 2 weeks. Please look for an email from [employer]--this is a free, online program that we'll use to keep in touch. To sign up, follow the link in the email. Learn more at [email or web address].    Where can I get more information?    To learn the [place]'s guidelines for staying home, please visit the [place] Department of Health website at https://www.health.state.[place]/diseases/coronavirus/basics.html.    To learn more about COVID-19 and how to care for yourself at home, please visit the CDC website at https://www.cdc.gov/coronavirus/2019-ncov/about/steps-when-sick.html.    For more options for care at [facility], please visit our website at [email or web address]      [place] Dept of Health ([facility]) COVID-19 Hotline:   [phone]    Positive COVID-19 letter Sent (Yes/No):  Yes    [Name]  Porsha Khalil, SONUN, RN

## 2021-06-11 NOTE — PROGRESS NOTES
Medical Examination      Assessment:   1. Encounter for CDL (commercial driving license) exam  - Urinalysis Macroscopic  - Vision Screening     Healthy male exam.   Meets standards in 49 .41;  qualifies for 2 year certificate.      Plan:        Medical examiners certificate completed and printed.  Return as needed.         Subjective:      Roya Peres is a 52 y.o. male who presents today for a  fitness determination physical exam. The patient reports no problems.  He had his physical exam is today with his primary care physician.  He did have some labs drawn, but he did not have any major concern except for slight pain around the Achilles tendon which was addressed.    Review of Systems  A 12 point comprehensive review of systems was negative except as noted.     Objective:      Vision:   Uncorrected Corrected Horizontal Field of Vision   Right Eye 20/25  Normal   Left Eye  20/20  Normal   Both Eyes  20/20       Applicant can recognize and distinguish among traffic control signals and devices showing standard red, green, and colin colors.    Monocular Vision?: No    Hearing:  Normal forced whisper 5 feet bilaterally.    PHYSICAL EXAM:  General Appearance: Alert, cooperative, no distress, appears stated age  Head: Normocephalic, without obvious abnormality, atraumatic  Eyes: Pupils equal, symmetric  Ears: Normal TMs and external ear canals, both ears  Nose: Nares normal, septum midline, mucosa normal, no drainage  Throat: Lips, mucosa, and tongue normal; teeth and gums normal  Neck: Supple, symmetrical, trachea midline, no adenopathy;  thyroid: not enlarged, symmetric, no tenderness  Back: Symmetric, no curvature, ROM normal, no CVA tenderness  Lungs: Clear to auscultation bilaterally, respirations unlabored  Heart: Regular rate and rhythm, S1 and S2 normal, no murmur, rub, or gallop  Abdomen: Soft, non-tender, bowel sounds active all four quadrants, no masses, no  organomegaly  Musculoskeletal: Normal range of motion. No joint swelling or deformity.   Extremities normal, atraumatic, no cyanosis or edema  Skin: Skin color, texture, turgor normal, no rashes or lesions  Lymph nodes: Cervical nodes normal  Neurologic:  Alert and oriented times 3. Normal reflexes. Cranial nerves II-XII intact.   Psychiatric: Normal mood and affect.    Labs:  Lab Results   Component Value Date    SPECGRAV 1.020 07/19/2017    BILIRUBINUR Negative 07/19/2017    GLUCOSEU Negative 07/19/2017

## 2021-06-11 NOTE — PROGRESS NOTES
"CHIEF COMPLAINT: Roya Peres had concerns including Annual Exam.    [place]: 1.............. had concerns including Annual Exam.    1. Well adult exam    2. Achilles tendonitis    3. Screening for prostate cancer    4. Essential Hypertriglyceridemia    5. Encounter for vitamin deficiency screening    6. Screening for deficiency anemia    7. Screen for colon cancer      No problem-specific Assessment & Plan notes found for this encounter.      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 male    Past Medical History:   Diagnosis Date     Hypertriglyceridemia      History reviewed. No pertinent surgical history.  Review of patient's allergies indicates no known allergies.  Current Outpatient Prescriptions   Medication Sig Dispense Refill     naproxen (EC NAPROSYN) 500 MG EC tablet Take 1 tablet (500 mg total) by mouth 2 (two) times a day with meals. 42 tablet 0     No current facility-administered medications for this visit.      History reviewed. No pertinent family history.  Social History     Social History     Marital status:      Spouse name: N/A     Number of children: N/A     Years of education: N/A     Social History Main Topics     Smoking status: Never Smoker     Smokeless tobacco: Never Used     Alcohol use No     Drug use: No     Sexual activity: Yes     Partners: Female     Birth control/ protection: None     Other Topics Concern     None     Social History " Narrative     Patient Active Problem List   Diagnosis     Essential Hypertriglyceridemia     Latent Tuberculosis     Arthropathy Of The Ankle / Foot     Arthropathy Of Multiple Sites     Esophageal Reflux     Acute Lateral Meniscal Tear     Helicobacter Pylori (H. Pylori) Infection                                              SOCIAL: He  reports that he has never smoked. He has never used smokeless tobacco. He reports that he does not drink alcohol or use illicit drugs.    REVIEW OF SYSTEMS:     FAMILY HISTORY NOT PERTINENT TO CHIEF COMPLAINT OR PRESENTING PROBLEM  Review of systems otherwise negative as requested from patient, except   Positive ROS outlined and discussed in [place].    OBJECTIVE:  /70 (Patient Site: Right Arm, Patient Position: Sitting, Cuff Size: Adult Large)  Pulse 71  Resp 16  Wt 173 lb 12 oz (78.8 kg)  SpO2 97%  BMI 27.01 kg/m2    GENERAL:     No acute distress.   Alert and oriented X 3         Physical:    Sclera clear pupils equal reactive no cataracts  TMs are clear  Oropharynx clear no cervical or supraclavicular nodes  Lungs are clear  Cardiac S1-S2 regular sinus no appreciable murmur  Abdomen soft flat nontender with positive bowel sounds  Full femoral pulses  's prostate smooth nontender without nodules  Tenderness to palpation Achilles tendon on the right      ASSESSMENT & PLAN      Ruc was seen today for annual exam.    Diagnoses and all orders for this visit:    Well adult exam  -     NMR with Lipid  -     Comprehensive Metabolic Panel  -     HM1(CBC and Differential)  -     C -Reactive Protein, High Sensitivity  -     Vitamin D, Total (25-Hydroxy)  -     Urinalysis-UC if Indicated  -     Vitamin B12  -     Thyroid Cascade  -     HM1 (CBC with Diff)    Achilles tendonitis    Screening for prostate cancer  -     PSA (Prostatic-Specific Antigen), Annual Screen    Essential Hypertriglyceridemia    Encounter for vitamin deficiency screening    Screening for deficiency anemia  -      Vitamin D, Total (25-Hydroxy)  -     Vitamin B12    Screen for colon cancer  -     Cologuard      No Follow-up on file.       Anticipatory Guidance and Symptomatic Cares Discussed   Advised to call back directly if there are further questions, or if these symptoms fail to improve as anticipated or worsen.  Return to clinic if patient has a clinical concern that warrants an exam.         30 Min Total Time, > 50% counseling and coordination of Care    Yoan Gutierrez MD  Family Medicine   [facility] [address] [phone]

## 2021-06-12 NOTE — PROGRESS NOTES
[facility] Heart Care Office Consult     Assessment:     1. Essential Hypertriglyceridemia -total cholesterol elevated at 220 with triglycerides elevated at 507 but not drawn at the same time.  I will arrange for repeat cholesterol levels with him fasting and a good diet and laying off beer.  In any event, he has no signs or symptoms of physical stigmata of familial hyper cholesterolemia homozygous let alone heterozygous.  He tells me his parents  at a young age, his father before the war and his mother from childbirth but worse case scenario is that he could have had homozygous hypercholesterolemia.  His profile certainly is not concerning with both his HDL and LDL sizes slightly decreased.  If numbers are significantly elevated would not hesitate to start very low-dose statin therapy.  Will also arrange for stress testing given question of family history.      Plan:   1.  Arrange for repeat fasting cholesterol with no beer.  2.  GXT.  If significant abnormalities will address.  3.  If fasting cholesterol still markedly elevated will start low-dose statin therapy.    History of Present Illness:   Thank you for asking the [facility] Heart Care team to see aparna Peres a 52 y.o. male  in consultation  to evaluate hypercholesterolemia.   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  This is showed his LDL to be 19.6 rather than over 20 and his HDL particle size to be 9.0 rather than over 9.2.  He is referred here for further discussion.  He does not speak English and via  denies any fatigue, syncope, dizziness, chest discomfort, palpitations, shortness breath, PND, orthopnea or peripheral edema.  He tells me his parents  at young age, apparently his mother in childbirth in his father before he can ever remember.  He does not think he  from medical problems.  Past Medical History:     Past Medical History:   Diagnosis Date     Hypertriglyceridemia      Past  "history is negative for cancer, tuberculosis, diabetes mellitus, myocardial infarction,  rheumatic fever, hypertension, cerebrovascular accident, chronic kidney disease, peptic ulcer disease, chronic obstructive pulmonary disease, or thyroid disorder  and no lipid disorder.    Past Surgical History:   History reviewed. No pertinent surgical history.    Family History:   Negative for coronary artery disease.    Social History:   He is , lives at home independently with his wife, works as a , reports that he has never smoked. He has never used smokeless tobacco. He reports that he does drink alcohol, beers on weekends, but does not use illicit drugs.The primary care physician is Yoan Gutierrez MD    Meds:   Scheduled Meds:  No current outpatient prescriptions on file.     No current facility-administered medications for this visit.      PRN Meds:.    Allergies:   Review of patient's allergies indicates no known allergies.    Objective:      Physical Exam  176 lb (79.8 kg)  5' 7\" (1.702 m)  Body mass index is 27.57 kg/(m^2).  /76  Pulse 60  Resp 20  Ht 5' 7\" (1.702 m)  Wt 176 lb (79.8 kg)  BMI 27.57 kg/m2    General Appearance:   Alert, cooperative and in no acute distress.   HEENT:  No scleral icterus; the mucous membranes were pink and moist.   Neck: JVP normal. No thyromegaly. No HJR   Chest: The spine was straight. The chest was symmetric.   Lungs:   Respirations unlabored; the lungs are clear to auscultation.   Cardiovascular:   S1 and S2 without murmur, clicks or rubs. Brachial, radial, carotid and posterior tibial pulses are intact and symetrical.  No carotid bruits noted   Abdomen:  No organomegaly, masses, bruits, or tenderness. Bowels sounds are present   Extremities: No cyanosis, clubbing, or edema.   Skin: No xanthelasma.   Neurologic: Mood and affect are appropriate.         Lab Reviewed Personally by myself  Lab Results   Component Value Date     07/18/2017    K 3.8 " 07/18/2017     07/18/2017    CO2 25 07/18/2017    BUN 14 07/18/2017    CREATININE 0.93 07/18/2017    CALCIUM 9.5 07/18/2017     Lab Results   Component Value Date    WBC 5.8 07/18/2017    HGB 14.7 07/18/2017    HCT 43.5 07/18/2017    MCV 94 07/18/2017     07/18/2017     Lab Results   Component Value Date    CHOL 220 (H) 06/10/2015    TRIG 507 (H) 06/10/2015    HDL 42 06/10/2015    LDLDIRECT 94 06/10/2015     No results found for: BNP    ECG personally reviewed by myself shows sinus bradycardia, rightward axis,  early repolarization with no acute changes.     Review of Systems:     Review of Systems:   General: WNL  Eyes: WNL  Ears/Nose/Throat: WNL  Lungs: WNL  Heart: WNL  Stomach: WNL  Bladder: WNL  Muscle/Joints: WNL  Skin: WNL  Nervous System: WNL  Mental Health: WNL     Blood: WNL

## 2021-06-16 PROBLEM — M54.50 CHRONIC MIDLINE LOW BACK PAIN WITHOUT SCIATICA: Status: ACTIVE | Noted: 2019-01-21

## 2021-06-16 PROBLEM — G89.29 CHRONIC MIDLINE LOW BACK PAIN WITHOUT SCIATICA: Status: ACTIVE | Noted: 2019-01-21

## 2021-06-16 NOTE — PROGRESS NOTES
"ASSESSMENT & PLAN      Roya was seen today for cough.    Diagnoses and all orders for this visit:    Tonsillar hypertrophy  -     Culture, Throat  -     Culture, Yeast  -     CT Sinuses Without Contrast; Future  -     Ambulatory referral to ENT    Coated tongue  -     Culture, Throat  -     Culture, Yeast  -     CT Sinuses Without Contrast; Future  -     Ambulatory referral to ENT    Screen for colon cancer  -     Cologuard    Gastroesophageal reflux disease without esophagitis    Other orders  -     ranitidine (ZANTAC) 150 MG capsule; Take 1 capsule (150 mg total) by mouth 2 (two) times a day.      No Follow-up on file.           CHIEF COMPLAINT: Roya Peres had concerns including Cough.    [place]: 1.............. had concerns including Cough.    1. Tonsillar hypertrophy    2. Coated tongue    3. Screen for colon cancer    4. Gastroesophageal reflux disease without esophagitis      No problem-specific Assessment & Plan notes found for this encounter.      CC:             Bitter taste in mouth  Whenever eat feels \"layer of yuckiness on tongue\"          What's it like:                      Layer of yuckiness tongue  How long is it ongoin month  What makes it worse :       Eating mlik juice bananas  What makes it better:         water  0/10-10/10:Pain/Intesity    No pain       Any associated Sx to above complaint:  Blood tinged phlegm,  + bad breath    No dysphagia  No Dyspnea      Reflux  History    SUBJECTIVE:  Roya Peres is a 52 y.o. male    Past Medical History:   Diagnosis Date     High cholesterol      Hypertriglyceridemia      No past surgical history on file.  Review of patient's allergies indicates no known allergies.  Current Outpatient Prescriptions   Medication Sig Dispense Refill     ranitidine (ZANTAC) 150 MG capsule Take 1 capsule (150 mg total) by mouth 2 (two) times a day. 60 capsule 0     No current facility-administered medications for this visit.      No family history on file.  Social History "     Social History     Marital status:      Spouse name: N/A     Number of children: N/A     Years of education: N/A     Social History Main Topics     Smoking status: Never Smoker     Smokeless tobacco: Never Used     Alcohol use No     Drug use: No     Sexual activity: Yes     Partners: Female     Birth control/ protection: None     Other Topics Concern     None     Social History Narrative     Patient Active Problem List   Diagnosis     Essential Hypertriglyceridemia     Latent Tuberculosis     Arthropathy Of The Ankle / Foot     Arthropathy Of Multiple Sites     Esophageal Reflux     Acute Lateral Meniscal Tear     Helicobacter Pylori (H. Pylori) Infection                                              SOCIAL: He  reports that he has never smoked. He has never used smokeless tobacco. He reports that he does not drink alcohol or use illicit drugs.    REVIEW OF SYSTEMS:   Family history not pertinent to chief complaint or presenting problem    Review of systems otherwise negative as requested from patient, except   Those positive ROS outlined and discussed in [place].    OBJECTIVE:  /78 (Patient Site: Right Arm, Patient Position: Sitting, Cuff Size: Adult Regular)  Pulse 70  Temp 98.4  F (36.9  C) (Oral)   Resp 15  Wt 173 lb (78.5 kg)  SpO2 98%  BMI 27.1 kg/m2    GENERAL:     No acute distress.   Alert and oriented X 3         Physical:    Nasal mucosa slightly hyperemic congested  Tonsils 3 in size bilateral hyperemia  No active changes  No tonsillar stones  Tongue has minimal coating with a white film  Multiple teeth are ground off  He has no cervical or supraclavicular nodes  Thyroid prep nontender without nodules  Lungs are clear  Cardiac S1-S2 regular sinus appreciable murmur  Recheck of his blood pressure is 128/84        ASSESSMENT & PLAN      [facility] was [facility] for cough.    Diagnoses and all orders for this visit:    Tonsillar hypertrophy  -     Culture, Throat  -     Culture, Yeast  -      CT Sinuses Without Contrast; Future  -     Ambulatory referral to ENT    Coated tongue  -     Culture, Throat  -     Culture, Yeast  -     CT Sinuses Without Contrast; Future  -     Ambulatory referral to ENT    Screen for colon cancer  -     Cologuard    Gastroesophageal reflux disease without esophagitis    Other orders  -     ranitidine (ZANTAC) 150 MG capsule; Take 1 capsule (150 mg total) by mouth 2 (two) times a day.      No Follow-up on file.       Anticipatory Guidance and Symptomatic Cares Discussed   Advised to call back directly if there are further questions, or if these symptoms fail to improve as anticipated or worsen.  Return to clinic if patient has a clinical concern that warrants an exam.        25  Min Total Time, > 50% counseling and coordination of Care    Yoan Gutierrez MD  Family Medicine   [facility] [address] [phone]

## 2021-06-17 NOTE — PROGRESS NOTES
ASSESSMENT & PLAN      Roya was seen today for results.    Diagnoses and all orders for this visit:    Essential Hypertriglyceridemia    Sinus congestion    Other orders  -     ranitidine (ZANTAC) 150 MG capsule; Take 1 capsule (150 mg total) by mouth 2 (two) times a day. As needed      No Follow-up on file.           CHIEF COMPLAINT: [facility] TAWANA Peres had concerns including Results.    [place]: 1.............. had concerns including Results.    1. Essential Hypertriglyceridemia    2. Sinus congestion      No problem-specific Assessment & Plan notes found for this encounter.      CC:              Following up on CT scan results and lab results. He would like zantac refills     What's it like:                      N/a   How long is it ongoing:      n/a  What makes it worse :       n/a  What makes it better:         N/a   0/10-10/10:Pain/Intesity     n/a      [facility]  CT SINUSES WITHOUT CONTRAST  4/3/2018 10:00 AM     INDICATION: Sinonasal obstruction, mass suspected.  TECHNIQUE: Direct thin section axial CT with coronal and sagittal  reconstruction. Dose reduction techniques were used.  COMPARISON: None.     FINDINGS:  POSTOPERATIVE CHANGES: None apparent.     FRONTAL SINUSES:  Mild mucosal thickening within the inferior frontal sinuses  bilaterally, left more than right.     ETHMOID SINUSES:  Mild mucosal thickening diffusely.     SPHENOID SINUSES: Mild mucosal thickening anteriorly.     MAXILLARY SINUSES: Mild circumferential mucosal thickening preferentially on the  left. The floors of the maxillary sinuses are intact.     NASAL CAVITY/SKULL BASE: Midline nasal septum without focal defect. Unremarkable  nasal turbinates. The cribriform plate is intact and symmetric. The anterior  clinoid processes are not pneumatized.     PARANASAL SINUS DRAINAGE PATHWAYS:  Mild narrowing of the left frontal sinus  drainage pathway. The sinus drainage pathways are otherwise widely patent.     NON-SINUS STRUCTURES: No  "intraorbital mass. The visualized intracranial contents  and mastoids are unremarkable.     CONCLUSION:  1.  Mild mucosal thickening throughout the paranasal sinuses. No fluid level.  2.  Patent paranasal sinus drainage pathways.       Any associated Sx to above complaint:   n/a    She comes in today to follow-up in a feeling of irritation in the back of his tongue he was worried about the possibility that he was having problems with sinuses as well as he was really concerned about the possibility of a nasopharyngeal carcinoma which a friend that had diagnosed in the recent past  Headaches minimal occasional not daily  Denies any stuffiness or pressure at the present time  Denies any postnasal drip  2 pets at home dogs    He has a history of hyper lipidemia with specifically elevated triglycerides at seen Dr. Dayo Ramsey stress test was negative he is precontemplation about medication we talked about the most important and imperative thing that he does is limit simple sugars as well as simple carbohydrates I have asked him to come in for a fasting lipid profile in the form of it and MR    CC:             Bitter taste in mouth  Whenever eat feels \"layer of yuckiness on tongue\"            Notes from last visit improved  What's it like:                      Layer of yuckiness tongue has resolved       Any associated Sx to above complaint:  Blood tinged phlegm resolved,  + bad breath   resolved     No dysphagia  No Dyspnea          SUBJECTIVE:  Roya Peres is a 52 y.o. male    Past Medical History:   Diagnosis Date     High cholesterol      Hypertriglyceridemia      No past surgical history on file.  Review of patient's allergies indicates no known allergies.  Current Outpatient Prescriptions   Medication Sig Dispense Refill     ranitidine (ZANTAC) 150 MG capsule Take 1 capsule (150 mg total) by mouth 2 (two) times a day. As needed 60 capsule 0     No current facility-administered medications for this visit.      No " family history on file.  Social History     Social History     Marital status:      Spouse name: N/A     Number of children: N/A     Years of education: N/A     Social History Main Topics     Smoking status: Never Smoker     Smokeless tobacco: Never Used     Alcohol use No     Drug use: No     Sexual activity: Yes     Partners: Female     Birth control/ protection: None     Other Topics Concern     Not on file     Social History Narrative     Patient Active Problem List   Diagnosis     Essential Hypertriglyceridemia     Arthropathy Of The Ankle / Foot     Arthropathy Of Multiple Sites     Esophageal Reflux     Acute Lateral Meniscal Tear     Helicobacter Pylori (H. Pylori) Infection                                              SOCIAL: He  reports that he has never smoked. He has never used smokeless tobacco. He reports that he does not drink alcohol or use illicit drugs.    REVIEW OF SYSTEMS:   Family history not pertinent to chief complaint or presenting problem    Review of systems otherwise negative as requested from patient, except   Those positive ROS outlined and discussed in [place].    OBJECTIVE:  /76 (Patient Site: Right Arm, Patient Position: Sitting)  Pulse 77  Wt 176 lb (79.8 kg)  SpO2 95%  BMI 27.57 kg/m2    GENERAL:     No acute distress.   Alert and oriented X 3         Physical:    His mucosa is mildly congested boggy  Oropharynx no coated tongue  Review of his sinus films CT no evidence of significant sinusitis very minimal paranasal sinusitis      ASSESSMENT & PLAN      [facility] was seen today for results.    Diagnoses and all orders for this visit:    Essential Hypertriglyceridemia    Sinus congestion    Other orders  -     ranitidine (ZANTAC) 150 MG capsule; Take 1 capsule (150 mg total) by mouth 2 (two) times a day. As needed    Amended nasal saline irrigation in the form of Candy Mckinley  Over-the-counter Flonase or prescription form  He can use as needed over-the-counter  antihistamines  Come in 1212 hrs. fasting for  NMR profile to evaluate his lipids I would have a low threshold for starting fish oil with omega-3 such as flax Mitchell or hemp along with possibly low-dose statin in the form of Vytorin           Anticipatory Guidance and Symptomatic Cares Discussed   Advised to call back directly if there are further questions, or if these symptoms fail to improve as anticipated or worsen.  Return to clinic if patient has a clinical concern that warrants an exam.        20   Min Total Time, > 50% counseling and coordination of Care    Yoan Gutierrez MD  Family Medicine   [facility] [id number] [phone]

## 2021-06-18 NOTE — LETTER
Letter by Too Barraza DO at      Author: Too Barraza DO Service: -- Author Type: --    Filed:  Encounter Date: 2/1/2019 Status: (Other)       February 1, 2019     Patient: Roya Peres   YOB: 1965   Date of Visit: 2/1/2019       To Whom it May Concern:    Roya Peres was seen in my clinic on 2/1/2019.     If you have any questions or concerns, please don't hesitate to call.    Sincerely,         Electronically signed by Too Barraza DO

## 2021-06-18 NOTE — PROGRESS NOTES
ASSESSMENT & PLAN    No problem-specific Assessment & Plan notes found for this encounter.      Diagnoses and all orders for this visit:    Screening for prostate cancer  -     PSA (Prostatic-Specific Antigen), Annual Screen    Essential Hypertriglyceridemia  -     NMR with Lipid  -     Vitamin D, Total (25-Hydroxy)  -     Thyroid Cascade  -     Thyroid Peroxidase Antibody; Future  -     Comprehensive Metabolic Panel    Travel advice encounter    Other orders  -     Typhoid, Inactive, Inj  -     Hepatitis A adult 2 dose IM (19 yr & older)  -     loperamide (IMODIUM A-D) 2 mg tablet; Take 1 tablet (2 mg total) by mouth 4 (four) times a day as needed for diarrhea.  -     ciprofloxacin HCl (CIPRO) 500 MG tablet; Take 1 tablet (500 mg total) by mouth 2 (two) times a day for 7 days.      No Follow-up on file.            CHIEF COMPLAINT: Roya Peres had no chief complaint listed for this encounter.    [place]: 1.............. had no chief complaint listed for this encounter.    1. Screening for prostate cancer    2. Essential Hypertriglyceridemia    3. Travel advice encounter          CC:              Travel consult. Gong to [facility] on 6/18/18 for 1 month.    Traveling to [facility]  History of dyslipidemia  Fasting today    Any other Problems in order of Priority:      H/O ELEVATED TRIGYLVERIDE  NO TOB  NO FAM HX HEART     WIFE  SEVERAL KIDS OK      SUBJECTIVE:  Roya Peres is a 52 y.o. male    Past Medical History:   Diagnosis Date     High cholesterol      Hypertriglyceridemia      No past surgical history on file.  Review of patient's allergies indicates no known allergies.  Current Outpatient Prescriptions   Medication Sig Dispense Refill     ciprofloxacin HCl (CIPRO) 500 MG tablet Take 1 tablet (500 mg total) by mouth 2 (two) times a day for 7 days. 14 tablet 0     loperamide (IMODIUM A-D) 2 mg tablet Take 1 tablet (2 mg total) by mouth 4 (four) times a day as needed for diarrhea. 30 tablet 0     ranitidine (ZANTAC) 150  "MG capsule Take 1 capsule (150 mg total) by mouth 2 (two) times a day. As needed 60 capsule 0     No current facility-administered medications for this visit.      No family history on file.  Social History     Social History     Marital status:      Spouse name: N/A     Number of children: N/A     Years of education: N/A     Social History Main Topics     Smoking status: Never Smoker     Smokeless tobacco: Never Used     Alcohol use No     Drug use: No     Sexual activity: Yes     Partners: Female     Birth control/ protection: None     Other Topics Concern     None     Social History Narrative     Patient Active Problem List   Diagnosis     Essential Hypertriglyceridemia     Arthropathy Of The Ankle / Foot     Arthropathy Of Multiple Sites     Esophageal Reflux     Acute Lateral Meniscal Tear     Helicobacter Pylori (H. Pylori) Infection                                              SOCIAL: He  reports that he has never smoked. He has never used smokeless tobacco. He reports that he does not drink alcohol or use illicit drugs.    REVIEW OF SYSTEMS:   Family history not pertinent to chief complaint or presenting problem    Review of systems otherwise negative as requested from patient, except   Those positive ROS outlined and discussed in [place].    OBJECTIVE:  /70  Pulse 70  Ht 5' 7\" (1.702 m)  Wt 172 lb (78 kg)  SpO2 98%  BMI 26.94 kg/m2    GENERAL:     No acute distress.   Alert and oriented X 3         Physical:    NORMAL AFFECT  NO JOINT ISSUES  BMI 27          ASSESSMENT & PLAN      Diagnoses and all orders for this visit:    Screening for prostate cancer  -     PSA (Prostatic-Specific Antigen), Annual Screen    Essential Hypertriglyceridemia  -     NMR with Lipid  -     Vitamin D, Total (25-Hydroxy)  -     Thyroid Cascade  -     Thyroid Peroxidase Antibody; Future  -     Comprehensive Metabolic Panel    Travel advice encounter    Other orders  -     Typhoid, Inactive, Inj  -     Hepatitis A " adult 2 dose IM (19 yr & older)  -     loperamide (IMODIUM A-D) 2 mg tablet; Take 1 tablet (2 mg total) by mouth 4 (four) times a day as needed for diarrhea.  -     ciprofloxacin HCl (CIPRO) 500 MG tablet; Take 1 tablet (500 mg total) by mouth 2 (two) times a day for 7 days.      No Follow-up on file.       Anticipatory Guidance and Symptomatic Cares Discussed   Advised to call back directly if there are further questions, or if these symptoms fail to improve as anticipated or worsen.  Return to clinic if patient has a clinical concern that warrants an exam.        25  Min Total Time, > 50% counseling and coordination of Care    Yoan Gutierrez MD  Family Medicine   [facility] [id number] [phone]

## 2021-06-20 NOTE — LETTER
Letter by Schlatter, Sarah, RN at      Author: Schlatter, Sarah, RN Service: -- Author Type: --    Filed:  Encounter Date: 5/20/2020 Status: (Other)       5/20/2020        [facility] [address] [place] [address]    This letter provides a written record that you were tested for COVID-19 on 5/18/20.     Your result was positive.     This means that we found the virus that causes COVID-19 in your sample.    How can I protect others?    For safety, its very important to follow these rules.    First, stay home and away from others (self-isolate) until:      Youve had no fever--and no medicine that reduces fever--for 3 full days (72 hours). And?     Your other symptoms have gotten better. For example, your cough or breathing has improved. And?    At least 10 days have passed since your symptoms started.    During this time:      Stay in your own room (and use your own bathroom), if you can.    Stay away from others in your home. No hugging, kissing or shaking hands.    Dont let anyone visit.    Dont go to work, school or anywhere else.     Clean high touch surfaces often (doorknobs, counters, handles, etc.). Use a household cleaning spray or wipes.    Cover your mouth and nose with a mask, tissue or washcloth to avoid spreading germs.    Wash your hands and face often with soap and water.    You should not go back to work until you meet the guidelines above for ending your home isolation. You should meet these along with any other guidelines that your employer has.    Employers: This document serves as formal notice of your employees medical guidelines for going back to work. They must meet the above guidelines before going back to work in person.    How can I take care of myself?    1. Get lots of rest. Drink extra fluids (unless a doctor has told you not to).    2. Take Tylenol (acetaminophen) for fever or pain. If you have liver or kidney problems, ask your family doctor if its okay to take Tylenol.     Take  either:     650 mg (two 325 mg pills) every 4 to 6 hours, or?    1,000 mg (two 500 mg pills) every 8 hours as needed.     Note: Dont take more than 3,000 mg in one day. Acetaminophen is found in many medicines (both prescribed and over-the-counter medicines). Read all labels to be sure you dont take too much.  For children, check the Tylenol bottle for the right dose. The dose is based on the filippo age or weight.    1. If you have other health problems (like cancer, heart failure, an organ transplant or severe kidney disease): Call your specialty clinic if you dont feel better in the next 2 days.    2. Know when to call 911: If your breathing is so bad that it keeps you from doing normal activities, call 911 or go to the emergency room. Tell them that youve been staying home and may have COVID-19.    3. Sign up for [employer]. We know its scary to hear that you have COVID-19. We want to track your symptoms to make sure youre okay over the next 2 weeks. Please look for an email from [employer]--this is a free, online program that well use to keep in touch. To sign up, follow the link in the email. Learn more at [email or web address].    4. Interested is participating in research? Visit the link below to view current clinical trials that apply to your situation:  [email or web address]    Where can I get more information?    To learn the [facility] guidelines for staying home, please visit the [place] Department of Health website at https://www.health.state.[place]/diseases/coronavirus/basics.html.    To learn more about COVID-19 and how to care for yourself at home, please visit the CDC website at https://www.cdc.gov/coronavirus/2019-ncov/about/steps-when-sick.html.    For more options for care at [facility], please visit our website at [email or web address]

## 2021-06-20 NOTE — LETTER
Letter by Schlatter, Sarah, RN at      Author: Schlatter, Sarah, RN Service: -- Author Type: --    Filed:  Encounter Date: 5/20/2020 Status: (Other)       5/20/2020        [facility] [address] [place] [address]    This letter provides a written record that you were tested for COVID-19 on 5/18/20.     Your result was positive.     This means that we found the virus that causes COVID-19 in your sample.    How can I protect others?    For safety, its very important to follow these rules.    First, stay home and away from others (self-isolate) until:      Youve had no fever--and no medicine that reduces fever--for 3 full days (72 hours). And?     Your other symptoms have gotten better. For example, your cough or breathing has improved. And?    At least 10 days have passed since your symptoms started.    During this time:      Stay in your own room (and use your own bathroom), if you can.    Stay away from others in your home. No hugging, kissing or shaking hands.    Dont let anyone visit.    Dont go to work, school or anywhere else.     Clean high touch surfaces often (doorknobs, counters, handles, etc.). Use a household cleaning spray or wipes.    Cover your mouth and nose with a mask, tissue or washcloth to avoid spreading germs.    Wash your hands and face often with soap and water.    You should not go back to work until you meet the guidelines above for ending your home isolation. You should meet these along with any other guidelines that your employer has.    Employers: This document serves as formal notice of your employees medical guidelines for going back to work. They must meet the above guidelines before going back to work in person.    How can I take care of myself?    1. Get lots of rest. Drink extra fluids (unless a doctor has told you not to).    2. Take Tylenol (acetaminophen) for fever or pain. If you have liver or kidney problems, ask your family doctor if its okay to take Tylenol.     Take  either:     650 mg (two 325 mg pills) every 4 to 6 hours, or?    1,000 mg (two 500 mg pills) every 8 hours as needed.     Note: Dont take more than 3,000 mg in one day. Acetaminophen is found in many medicines (both prescribed and over-the-counter medicines). Read all labels to be sure you dont take too much.  For children, check the Tylenol bottle for the right dose. The dose is based on the filippo age or weight.    1. If you have other health problems (like cancer, heart failure, an organ transplant or severe kidney disease): Call your specialty clinic if you dont feel better in the next 2 days.    2. Know when to call 911: If your breathing is so bad that it keeps you from doing normal activities, call 911 or go to the emergency room. Tell them that youve been staying home and may have COVID-19.    3. Sign up for [employer]. We know its scary to hear that you have COVID-19. We want to track your symptoms to make sure youre okay over the next 2 weeks. Please look for an email from [employer]--this is a free, online program that well use to keep in touch. To sign up, follow the link in the email. Learn more at [email or web address].    4. Interested is participating in research? Visit the link below to view current clinical trials that apply to your situation:  [email or web address]    Where can I get more information?    To learn the [facility] guidelines for staying home, please visit the [place] Department of Health website at https://www.health.state.[place]/diseases/coronavirus/basics.html.    To learn more about COVID-19 and how to care for yourself at home, please visit the CDC website at https://www.cdc.gov/coronavirus/2019-ncov/about/steps-when-sick.html.    For more options for care at [facility], please visit our website at [email or web address]

## 2021-06-21 NOTE — LETTER
Letter by Yoan Gutierrez MD at      Author: Yoan Gutierrez MD Service: -- Author Type: --    Filed:  Encounter Date: 2/15/2021 Status: (Other)         February 15, 2021     Patient: Roya Peres   YOB: 1965   Date of Visit: 2/15/2021       To Whom It May Concern:    It is my medical opinion that Roya Peres should be at home to care for his spouse from 2/15/2021 through 2/21/2021 as she is recovering from surgical complications.       If you have any questions or concerns, please don't hesitate to call.    Sincerely,        Electronically signed by Yoan Gutierrez MD
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
## 2021-06-22 NOTE — TELEPHONE ENCOUNTER
----- Message from Yoan Gutierrez MD sent at 1/3/2019  6:15 PM CST -----  Please tell Ruc his heart score is Excellent.  No to work on his triglycerides Kenny

## 2021-06-22 NOTE — PROGRESS NOTES
[facility] was sent to [facility] spine care follow-up and an abnormal MRI  He was found to have an L5-S1 herniated disc pressing on his exiting left nerve root    He was also found to have ankylosis of the spine    He was also found to have a possible nerve sheath tumor which needs further evaluation    He has complex dyslipidemia he is a male age 53 have also sent him for cardiac calcium score screening to evaluate his underlying risk for coronary disease    I would like him to see cardiology if he has a high risk score    Kenny

## 2021-06-23 NOTE — TELEPHONE ENCOUNTER
----- Message from Johanny Ramsey MD sent at 2/1/2019  4:29 PM CST -----  Can we prior authorize either TriCor or Lopid for this gentleman, 145 a day or 600 twice a day respectively?  If not let me know and may have to try statin therapy.  He does not speak English.  LF

## 2021-06-23 NOTE — TELEPHONE ENCOUNTER
Attempted to call [facility] to check which is covered. On hold for long period of time. Will try back again later. -[facility] [place]
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
## 2021-06-23 NOTE — PATIENT INSTRUCTIONS - HE
Mr Roya Peres,  I enjoyed visiting with you again today.  I am glad to hear you are doing well.  Per our conversation we will try to get some cholesterol medications for you.  Dayo Ramsey

## 2021-06-23 NOTE — TELEPHONE ENCOUNTER
-- Message -----  From: Johanny Ramsey MD  Sent: 2/8/2019   3:55 PM  To: Bobbi Aburto RN    Generic TriCor once a day would be optimal.  145 I believe is dose.  LF        TriCor ordered and sent to [facility] pharmacy. M with assistance of  informing him that this medication was ordered and should be covered as writer spoke with his insurance company and was told that the cost was low. Left my direct number to call back if he should have any questions. -[facility] [place]
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
## 2021-06-23 NOTE — TELEPHONE ENCOUNTER
Was able to find out that both drugs are Tier 1 drugs and are covered and are approximately $7 dollars per month for generic version and $25 dollars a month for brand name, 90 day supply.      Dr. Ramsey,  I spoke with [employer], the patient's insurance company.Both medications are covered at low cost if I can order generic. Do you have a preference of TriCor or Lopid?   Thanks,  Mal

## 2021-06-23 NOTE — PATIENT INSTRUCTIONS - HE
1. Get the MRI of your lumbar spine to evaluate the nerve sheath cyst    2. Continue with you home exercises    3. If you want to try a different medication or steroid injection, let me know    4. lumbar handout

## 2021-08-19 ENCOUNTER — OFFICE VISIT (OUTPATIENT)
Dept: FAMILY MEDICINE | Facility: CLINIC | Age: 56
End: 2021-08-19
Payer: COMMERCIAL

## 2021-08-19 VITALS
SYSTOLIC BLOOD PRESSURE: 129 MMHG | WEIGHT: 176.56 LBS | HEART RATE: 86 BPM | HEIGHT: 68 IN | OXYGEN SATURATION: 98 % | DIASTOLIC BLOOD PRESSURE: 89 MMHG | BODY MASS INDEX: 26.76 KG/M2

## 2021-08-19 DIAGNOSIS — G89.29 CHRONIC MIDLINE LOW BACK PAIN WITHOUT SCIATICA: ICD-10-CM

## 2021-08-19 DIAGNOSIS — Z11.59 NEED FOR HEPATITIS C SCREENING TEST: ICD-10-CM

## 2021-08-19 DIAGNOSIS — K21.9 GASTROESOPHAGEAL REFLUX DISEASE, UNSPECIFIED WHETHER ESOPHAGITIS PRESENT: ICD-10-CM

## 2021-08-19 DIAGNOSIS — Z12.5 SPECIAL SCREENING FOR MALIGNANT NEOPLASM OF PROSTATE: ICD-10-CM

## 2021-08-19 DIAGNOSIS — E78.1 PURE HYPERGLYCERIDEMIA: Primary | ICD-10-CM

## 2021-08-19 DIAGNOSIS — Z11.4 SCREENING FOR HIV (HUMAN IMMUNODEFICIENCY VIRUS): ICD-10-CM

## 2021-08-19 DIAGNOSIS — Z00.00 ANNUAL PHYSICAL EXAM: ICD-10-CM

## 2021-08-19 DIAGNOSIS — Z00.00 HEALTHCARE MAINTENANCE: ICD-10-CM

## 2021-08-19 DIAGNOSIS — Z13.220 SCREENING FOR LIPOID DISORDERS: ICD-10-CM

## 2021-08-19 DIAGNOSIS — Z86.39 HISTORY OF VITAMIN D DEFICIENCY: ICD-10-CM

## 2021-08-19 DIAGNOSIS — M54.50 CHRONIC MIDLINE LOW BACK PAIN WITHOUT SCIATICA: ICD-10-CM

## 2021-08-19 DIAGNOSIS — Z12.11 SPECIAL SCREENING FOR MALIGNANT NEOPLASMS, COLON: ICD-10-CM

## 2021-08-19 PROCEDURE — 99396 PREV VISIT EST AGE 40-64: CPT | Mod: 25 | Performed by: NURSE PRACTITIONER

## 2021-08-19 ASSESSMENT — MIFFLIN-ST. JEOR: SCORE: 1607.44

## 2021-08-19 NOTE — PATIENT INSTRUCTIONS
"Blood pressure and other vital signs look good today.    BMI is slightly elevated; this places you into the \"overweight\" category.  Ideally, I would like you to lose 15 to 20 pounds over the next 12 months or so.    We will recheck a variety of labs today.  I encourage you to sign [facility] for [facility] to review these labs.  Otherwise, we will mail the results to you.    It is extremely important that you follow-up with your primary doctor in 6 to 8 weeks to review your lab work and discuss cholesterol medication.    I recommend at least 1 dentist visit per year.  "
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
## 2021-09-21 LAB — COLOGUARD-ABSTRACT: NEGATIVE

## 2022-05-24 ENCOUNTER — APPOINTMENT (OUTPATIENT)
Dept: RADIOLOGY | Facility: HOSPITAL | Age: 57
End: 2022-05-24
Attending: EMERGENCY MEDICINE
Payer: COMMERCIAL

## 2022-05-24 ENCOUNTER — HOSPITAL ENCOUNTER (EMERGENCY)
Facility: HOSPITAL | Age: 57
Discharge: HOME | End: 2022-05-24
Attending: EMERGENCY MEDICINE | Admitting: EMERGENCY MEDICINE
Payer: COMMERCIAL

## 2022-05-24 ENCOUNTER — OFFICE VISIT (OUTPATIENT)
Dept: FAMILY MEDICINE | Facility: CLINIC | Age: 57
End: 2022-05-24
Payer: COMMERCIAL

## 2022-05-24 VITALS — HEART RATE: 103 BPM | DIASTOLIC BLOOD PRESSURE: 70 MMHG | OXYGEN SATURATION: 95 % | SYSTOLIC BLOOD PRESSURE: 122 MMHG

## 2022-05-24 VITALS
HEIGHT: 68 IN | HEART RATE: 80 BPM | BODY MASS INDEX: 25.01 KG/M2 | OXYGEN SATURATION: 97 % | TEMPERATURE: 98.7 F | RESPIRATION RATE: 31 BRPM | WEIGHT: 165 LBS | SYSTOLIC BLOOD PRESSURE: 106 MMHG | DIASTOLIC BLOOD PRESSURE: 83 MMHG

## 2022-05-24 DIAGNOSIS — K21.9 GASTROESOPHAGEAL REFLUX DISEASE WITHOUT ESOPHAGITIS: ICD-10-CM

## 2022-05-24 DIAGNOSIS — K21.00 GASTROESOPHAGEAL REFLUX DISEASE WITH ESOPHAGITIS, UNSPECIFIED WHETHER HEMORRHAGE: ICD-10-CM

## 2022-05-24 DIAGNOSIS — R07.89 ATYPICAL CHEST PAIN: ICD-10-CM

## 2022-05-24 DIAGNOSIS — R07.9 CHEST PAIN, UNSPECIFIED TYPE: Primary | ICD-10-CM

## 2022-05-24 DIAGNOSIS — R06.02 SHORTNESS OF BREATH: ICD-10-CM

## 2022-05-24 DIAGNOSIS — J02.9 PHARYNGITIS, UNSPECIFIED ETIOLOGY: ICD-10-CM

## 2022-05-24 DIAGNOSIS — R79.9 ABNORMAL BLOOD CHEMISTRY: ICD-10-CM

## 2022-05-24 LAB
ALBUMIN SERPL-MCNC: 3.7 G/DL (ref 3.5–5)
ALBUMIN SERPL-MCNC: 4 G/DL (ref 3.5–5)
ALP SERPL-CCNC: 104 U/L (ref 45–120)
ALP SERPL-CCNC: 99 U/L (ref 45–120)
ALT SERPL W P-5'-P-CCNC: 41 U/L (ref 0–45)
ALT SERPL W P-5'-P-CCNC: 46 U/L (ref 0–45)
ANION GAP SERPL CALCULATED.3IONS-SCNC: 11 MMOL/L (ref 5–18)
ANION GAP SERPL CALCULATED.3IONS-SCNC: 8 MMOL/L (ref 5–18)
AST SERPL W P-5'-P-CCNC: 25 U/L (ref 0–40)
AST SERPL W P-5'-P-CCNC: 28 U/L (ref 0–40)
ATRIAL RATE - MUSE: 106 BPM
ATRIAL RATE - MUSE: 84 BPM
ATRIAL RATE - MUSE: 92 BPM
BASOPHILS # BLD AUTO: 0.1 10E3/UL (ref 0–0.2)
BASOPHILS NFR BLD AUTO: 1 %
BILIRUB DIRECT SERPL-MCNC: 0.2 MG/DL
BILIRUB SERPL-MCNC: 1 MG/DL (ref 0–1)
BILIRUB SERPL-MCNC: 1 MG/DL (ref 0–1)
BUN SERPL-MCNC: 18 MG/DL (ref 8–22)
BUN SERPL-MCNC: 18 MG/DL (ref 8–22)
CALCIUM SERPL-MCNC: 10.1 MG/DL (ref 8.5–10.5)
CALCIUM SERPL-MCNC: 9.8 MG/DL (ref 8.5–10.5)
CHLORIDE BLD-SCNC: 105 MMOL/L (ref 98–107)
CHLORIDE BLD-SCNC: 105 MMOL/L (ref 98–107)
CO2 SERPL-SCNC: 23 MMOL/L (ref 22–31)
CO2 SERPL-SCNC: 25 MMOL/L (ref 22–31)
CREAT SERPL-MCNC: 0.91 MG/DL (ref 0.7–1.3)
CREAT SERPL-MCNC: 0.94 MG/DL (ref 0.7–1.3)
D DIMER PPP FEU-MCNC: 0.4 UG/ML FEU (ref 0–0.5)
DEPRECATED S PYO AG THROAT QL EIA: NEGATIVE
DIASTOLIC BLOOD PRESSURE - MUSE: NORMAL MMHG
EOSINOPHIL # BLD AUTO: 0.1 10E3/UL (ref 0–0.7)
EOSINOPHIL NFR BLD AUTO: 3 %
ERYTHROCYTE [DISTWIDTH] IN BLOOD BY AUTOMATED COUNT: 12.1 % (ref 10–15)
ERYTHROCYTE [DISTWIDTH] IN BLOOD BY AUTOMATED COUNT: 12.1 % (ref 10–15)
GFR SERPL CREATININE-BSD FRML MDRD: >90 ML/MIN/1.73M2
GFR SERPL CREATININE-BSD FRML MDRD: >90 ML/MIN/1.73M2
GLUCOSE BLD-MCNC: 110 MG/DL (ref 70–125)
GLUCOSE BLD-MCNC: 154 MG/DL (ref 70–125)
GROUP A STREP BY PCR: NOT DETECTED
HCT VFR BLD AUTO: 41.9 % (ref 40–53)
HCT VFR BLD AUTO: 43.8 % (ref 40–53)
HGB BLD-MCNC: 14.6 G/DL (ref 13.3–17.7)
HGB BLD-MCNC: 15.3 G/DL (ref 13.3–17.7)
HOLD SPECIMEN: NORMAL
IMM GRANULOCYTES # BLD: 0 10E3/UL
IMM GRANULOCYTES NFR BLD: 0 %
INTERPRETATION ECG - MUSE: NORMAL
LIPASE SERPL-CCNC: 35 U/L (ref 0–52)
LIPASE SERPL-CCNC: 35 U/L (ref 0–52)
LYMPHOCYTES # BLD AUTO: 1.8 10E3/UL (ref 0.8–5.3)
LYMPHOCYTES NFR BLD AUTO: 35 %
MCH RBC QN AUTO: 31 PG (ref 26.5–33)
MCH RBC QN AUTO: 31.4 PG (ref 26.5–33)
MCHC RBC AUTO-ENTMCNC: 34.8 G/DL (ref 31.5–36.5)
MCHC RBC AUTO-ENTMCNC: 34.9 G/DL (ref 31.5–36.5)
MCV RBC AUTO: 89 FL (ref 78–100)
MCV RBC AUTO: 90 FL (ref 78–100)
MONOCYTES # BLD AUTO: 0.6 10E3/UL (ref 0–1.3)
MONOCYTES NFR BLD AUTO: 11 %
NEUTROPHILS # BLD AUTO: 2.7 10E3/UL (ref 1.6–8.3)
NEUTROPHILS NFR BLD AUTO: 50 %
NRBC # BLD AUTO: 0 10E3/UL
NRBC BLD AUTO-RTO: 0 /100
NT-PROBNP SERPL-MCNC: 33 PG/ML (ref 0–900)
P AXIS - MUSE: 35 DEGREES
P AXIS - MUSE: 37 DEGREES
P AXIS - MUSE: 46 DEGREES
PLATELET # BLD AUTO: 222 10E3/UL (ref 150–450)
PLATELET # BLD AUTO: 231 10E3/UL (ref 150–450)
POTASSIUM BLD-SCNC: 3.9 MMOL/L (ref 3.5–5)
POTASSIUM BLD-SCNC: 4 MMOL/L (ref 3.5–5)
PR INTERVAL - MUSE: 152 MS
PR INTERVAL - MUSE: 170 MS
PR INTERVAL - MUSE: 170 MS
PROT SERPL-MCNC: 7.4 G/DL (ref 6–8)
PROT SERPL-MCNC: 7.7 G/DL (ref 6–8)
QRS DURATION - MUSE: 100 MS
QRS DURATION - MUSE: 88 MS
QRS DURATION - MUSE: 92 MS
QT - MUSE: 342 MS
QT - MUSE: 348 MS
QT - MUSE: 354 MS
QTC - MUSE: 418 MS
QTC - MUSE: 430 MS
QTC - MUSE: 454 MS
R AXIS - MUSE: 28 DEGREES
R AXIS - MUSE: 41 DEGREES
R AXIS - MUSE: 58 DEGREES
RBC # BLD AUTO: 4.71 10E6/UL (ref 4.4–5.9)
RBC # BLD AUTO: 4.87 10E6/UL (ref 4.4–5.9)
SODIUM SERPL-SCNC: 138 MMOL/L (ref 136–145)
SODIUM SERPL-SCNC: 139 MMOL/L (ref 136–145)
SYSTOLIC BLOOD PRESSURE - MUSE: NORMAL MMHG
T AXIS - MUSE: 14 DEGREES
T AXIS - MUSE: 18 DEGREES
T AXIS - MUSE: 19 DEGREES
TROPONIN I SERPL-MCNC: <0.01 NG/ML (ref 0–0.29)
URATE SERPL-MCNC: 8 MG/DL (ref 3–8)
VENTRICULAR RATE- MUSE: 106 BPM
VENTRICULAR RATE- MUSE: 84 BPM
VENTRICULAR RATE- MUSE: 92 BPM
WBC # BLD AUTO: 5.2 10E3/UL (ref 4–11)
WBC # BLD AUTO: 5.3 10E3/UL (ref 4–11)

## 2022-05-24 PROCEDURE — 36415 COLL VENOUS BLD VENIPUNCTURE: CPT | Performed by: EMERGENCY MEDICINE

## 2022-05-24 PROCEDURE — 84484 ASSAY OF TROPONIN QUANT: CPT | Performed by: FAMILY MEDICINE

## 2022-05-24 PROCEDURE — 83690 ASSAY OF LIPASE: CPT | Performed by: EMERGENCY MEDICINE

## 2022-05-24 PROCEDURE — 99285 EMERGENCY DEPT VISIT HI MDM: CPT | Mod: 25

## 2022-05-24 PROCEDURE — 250N000013 HC RX MED GY IP 250 OP 250 PS 637: Performed by: EMERGENCY MEDICINE

## 2022-05-24 PROCEDURE — 99214 OFFICE O/P EST MOD 30 MIN: CPT | Performed by: FAMILY MEDICINE

## 2022-05-24 PROCEDURE — 84450 TRANSFERASE (AST) (SGOT): CPT | Performed by: FAMILY MEDICINE

## 2022-05-24 PROCEDURE — 83880 ASSAY OF NATRIURETIC PEPTIDE: CPT | Performed by: FAMILY MEDICINE

## 2022-05-24 PROCEDURE — 93005 ELECTROCARDIOGRAM TRACING: CPT | Performed by: FAMILY MEDICINE

## 2022-05-24 PROCEDURE — 84155 ASSAY OF PROTEIN SERUM: CPT | Performed by: FAMILY MEDICINE

## 2022-05-24 PROCEDURE — 93010 ELECTROCARDIOGRAM REPORT: CPT | Mod: 76 | Performed by: INTERNAL MEDICINE

## 2022-05-24 PROCEDURE — 250N000009 HC RX 250: Performed by: EMERGENCY MEDICINE

## 2022-05-24 PROCEDURE — 82248 BILIRUBIN DIRECT: CPT | Performed by: EMERGENCY MEDICINE

## 2022-05-24 PROCEDURE — 84460 ALANINE AMINO (ALT) (SGPT): CPT | Performed by: FAMILY MEDICINE

## 2022-05-24 PROCEDURE — 85379 FIBRIN DEGRADATION QUANT: CPT | Performed by: FAMILY MEDICINE

## 2022-05-24 PROCEDURE — 83690 ASSAY OF LIPASE: CPT | Performed by: FAMILY MEDICINE

## 2022-05-24 PROCEDURE — 71046 X-RAY EXAM CHEST 2 VIEWS: CPT

## 2022-05-24 PROCEDURE — 85027 COMPLETE CBC AUTOMATED: CPT | Performed by: EMERGENCY MEDICINE

## 2022-05-24 PROCEDURE — 84484 ASSAY OF TROPONIN QUANT: CPT | Performed by: EMERGENCY MEDICINE

## 2022-05-24 PROCEDURE — 36415 COLL VENOUS BLD VENIPUNCTURE: CPT | Performed by: FAMILY MEDICINE

## 2022-05-24 PROCEDURE — 82247 BILIRUBIN TOTAL: CPT | Performed by: FAMILY MEDICINE

## 2022-05-24 PROCEDURE — 84075 ASSAY ALKALINE PHOSPHATASE: CPT | Performed by: FAMILY MEDICINE

## 2022-05-24 PROCEDURE — 84550 ASSAY OF BLOOD/URIC ACID: CPT | Performed by: FAMILY MEDICINE

## 2022-05-24 PROCEDURE — 87651 STREP A DNA AMP PROBE: CPT | Performed by: FAMILY MEDICINE

## 2022-05-24 PROCEDURE — 85025 COMPLETE CBC W/AUTO DIFF WBC: CPT | Performed by: FAMILY MEDICINE

## 2022-05-24 PROCEDURE — 84484 ASSAY OF TROPONIN QUANT: CPT | Mod: 91 | Performed by: EMERGENCY MEDICINE

## 2022-05-24 PROCEDURE — 93005 ELECTROCARDIOGRAM TRACING: CPT | Performed by: EMERGENCY MEDICINE

## 2022-05-24 RX ORDER — PANTOPRAZOLE SODIUM 40 MG/1
40 TABLET, DELAYED RELEASE ORAL DAILY
Qty: 90 TABLET | Refills: 0 | Status: SHIPPED | OUTPATIENT
Start: 2022-05-24

## 2022-05-24 RX ORDER — NITROGLYCERIN 0.4 MG/1
0.4 TABLET SUBLINGUAL EVERY 5 MIN PRN
Status: COMPLETED | OUTPATIENT
Start: 2022-05-24 | End: 2022-05-24

## 2022-05-24 RX ORDER — SUCRALFATE ORAL 1 G/10ML
1 SUSPENSION ORAL 4 TIMES DAILY
Qty: 414 ML | Refills: 0 | Status: SHIPPED | OUTPATIENT
Start: 2022-05-24

## 2022-05-24 RX ORDER — METOPROLOL SUCCINATE 25 MG/1
25 TABLET, EXTENDED RELEASE ORAL DAILY
Qty: 2 TABLET | Refills: 0 | Status: SHIPPED | OUTPATIENT
Start: 2022-05-24

## 2022-05-24 RX ADMIN — ALUMINA, MAGNESIA, AND SIMETHICONE ORAL SUSPENSION REGULAR STRENGTH 30 ML: 1200; 1200; 120 SUSPENSION ORAL at 16:59

## 2022-05-24 RX ADMIN — NITROGLYCERIN 0.4 MG: 0.4 TABLET SUBLINGUAL at 13:52

## 2022-05-24 ASSESSMENT — HEART SCORE
HEART SCORE: 2
TROPONIN: LESS THAN OR EQUAL TO NORMAL LIMIT
ECG: NORMAL
HISTORY: SLIGHTLY SUSPICIOUS
AGE: 45-64
RISK FACTORS: 1-2 RISK FACTORS

## 2022-05-24 ASSESSMENT — ENCOUNTER SYMPTOMS
FEVER: 0
BACK PAIN: 0
DYSURIA: 0
WEAKNESS: 1
TROUBLE SWALLOWING: 0
ABDOMINAL PAIN: 0
CONFUSION: 0
LIGHT-HEADEDNESS: 1
COUGH: 0

## 2022-05-24 NOTE — PROGRESS NOTES
"    ASSESSMENT & PLAN    Shortness of breath  5-6 months     Can't get a deep breath    Heartburn??    No other symptoms   \"like a heartburn sensation\"  Precipitate? Nothing     How Often? Daily   Cough?  No   Pressure with food? Yes    \"like food is stuck like a bubble\"   \" then it passes\"    Sweaty? No   Nauseated?  No   Bad taste in back mouth? No     Something in my lung    \"all of sudden hard to breathe and exhausted\"    Why now?   Any triggers?  Any make better?        [facility] was seen today for follow up.    Diagnoses and all orders for this visit:    Chest pain, unspecified type  -     Troponin I; Future  -     BNP-N terminal pro; Future  -     D dimer, quantitative; Future  -     EKG 12-lead, tracing only  -     Cancel: CT Angiogram coronary artery; Future  -     pantoprazole (PROTONIX) 40 MG EC tablet; Take 1 tablet (40 mg) by mouth daily  -     Comprehensive metabolic panel (BMP + Alb, Alk Phos, ALT, AST, Total. Bili, TP); Future  -     CBC with platelets; Future  -     Uric acid; Future  -     CT Angiogram coronary artery; Future  -     Lipase; Future  -     metoprolol succinate ER (TOPROL XL) 25 MG 24 hr tablet; Take 1 tablet (25 mg) by mouth daily The night prior to CT angiogram in the day of CT angiogram  -     sucralfate (CARAFATE) 1 GM/10ML suspension; Take 10 mLs (1 g) by mouth 4 times daily Before meals and bedtime  -     Troponin I  -     BNP-N terminal pro  -     D dimer, quantitative  -     Comprehensive metabolic panel (BMP + Alb, Alk Phos, ALT, AST, Total. Bili, TP)  -     CBC with platelets  -     Uric acid  -     Lipase  -     nitroGLYcerin (NITROSTAT) sublingual tablet 0.4 mg  -     EKG 12-lead, tracing only    Shortness of breath  -     Troponin I; Future  -     BNP-N terminal pro; Future  -     D dimer, quantitative; Future  -     EKG 12-lead, tracing only  -     Cancel: CT Angiogram coronary artery; Future  -     pantoprazole (PROTONIX) 40 MG EC tablet; Take 1 tablet (40 mg) by mouth " daily  -     Comprehensive metabolic panel (BMP + Alb, Alk Phos, ALT, AST, Total. Bili, TP); Future  -     CBC with platelets; Future  -     Uric acid; Future  -     CT Angiogram coronary artery; Future  -     sucralfate (CARAFATE) 1 GM/10ML suspension; Take 10 mLs (1 g) by mouth 4 times daily Before meals and bedtime  -     Troponin I  -     BNP-N terminal pro  -     D dimer, quantitative  -     Comprehensive metabolic panel (BMP + Alb, Alk Phos, ALT, AST, Total. Bili, TP)  -     CBC with platelets  -     Uric acid  -     nitroGLYcerin (NITROSTAT) sublingual tablet 0.4 mg  -     EKG 12-lead, tracing only    Gastroesophageal reflux disease without esophagitis  -     pantoprazole (PROTONIX) 40 MG EC tablet; Take 1 tablet (40 mg) by mouth daily  -     sucralfate (CARAFATE) 1 GM/10ML suspension; Take 10 mLs (1 g) by mouth 4 times daily Before meals and bedtime    Abnormal blood chemistry  -     Comprehensive metabolic panel (BMP + Alb, Alk Phos, ALT, AST, Total. Bili, TP); Future  -     CBC with platelets; Future  -     Uric acid; Future  -     Comprehensive metabolic panel (BMP + Alb, Alk Phos, ALT, AST, Total. Bili, TP)  -     CBC with platelets  -     Uric acid    Pharyngitis, unspecified etiology  -     Streptococcus A Rapid Scr w Reflx to PCR - Lab Collect; Future  -     Streptococcus A Rapid Scr w Reflx to PCR - Lab Collect  -     Group A Streptococcus PCR Throat Swab    Other orders  -     REVIEW OF HEALTH MAINTENANCE PROTOCOL ORDERS        There are no Patient Instructions on file for this visit.    Return in about 3 days (around 5/27/2022).       PATIENT HEALTH QUESTIONNAIRE-9 (PHQ - 9)    Over the last 2 weeks, how often have you been bothered by any of the following problems?    1. Little interest or pleasure in doing things -      2. Feeling down, depressed, or hopeless -      3. Trouble falling or staying asleep, or sleeping too much -     4. Feeling tired or having little energy -      5. Poor appetite or  "overeating -      6. Feeling bad about yourself - or that you are a failure or have let yourself or your family down -      7. Trouble concentrating on things, such as reading the newspaper or watching television -     8. Moving or speaking so slowly that other people could have noticed? Or the opposite - being so fidgety or restless that you have been moving around a lot more than usual     9. Thoughts that you would be better off dead or of hurting  yourself in some way     Total Score:       If you checked off any problems, how difficult have these problems made it for you to do your work, take care of things at home, or get along with other people?      Developed by Mark Pearson, Janelle Leal, Cristian Alvarez and colleagues, with an educational ruth from [employer] No permission required to reproduce, translate, display or distribute. permission required to reproduce, translate, display or distribute.    CHIEF COMPLAINT: Roya Peres had concerns including Follow Up (\"Heart problems\"-heart burn).    [place]: 1.............. SUBJECTIVE:  Roya Peres is a 56 year old male had concerns including Follow Up (\"Heart problems\"-heart burn).    1. Chest pain, unspecified type    2. Shortness of breath    3. Gastroesophageal reflux disease without esophagitis    4. Abnormal blood chemistry    5. Pharyngitis, unspecified etiology          No Known Allergies                      SOCIAL: He  reports that he has never smoked. He has never used smokeless tobacco. He reports that he does not drink alcohol and does not use drugs.    REVIEW OF SYSTEMS:   Family history not pertinent to chief complaint or presenting problem    Review of systems otherwise negative as requested from patient, except   Those positive ROS outlined and discussed in [place].      VITALS:  Vitals:    05/24/22 1300   BP: 122/70   Pulse: 103   SpO2: 95%     Wt Readings from Last 3 Encounters:   08/19/21 80.1 kg (176 lb 9 oz)   02/01/19 79.4 kg (175 " lb)   12/04/18 77.9 kg (171 lb 12 oz)     There is no height or weight on file to calculate BMI.    Physical Exam:  Full range of affect  Lungs clear  Cardiac regular rate and rhythm no appreciable murmur  No carotid bruits  Thyroid pump nontender without nodules  Slight injection of the posterior pharynx    No lower extreme edema  Excellent distal pulses  Abdomen soft flat nontender no appreciable organ enlargement    Pain 3/10     NTG   3/10  >>> 0/10     EKG  Inferior Twave abnormal       I spent 30  minutes with this patient.  This includes pre-visit, intra-visit and post visit work an evaluation with regards to [facility] [place] was seen today for follow up.    Diagnoses and all orders for this visit:    Chest pain, unspecified type  -     Troponin I; Future  -     BNP-N terminal pro; Future  -     D dimer, quantitative; Future  -     EKG 12-lead, tracing only  -     Cancel: CT Angiogram coronary artery; Future  -     pantoprazole (PROTONIX) 40 MG EC tablet; Take 1 tablet (40 mg) by mouth daily  -     Comprehensive metabolic panel (BMP + Alb, Alk Phos, ALT, AST, Total. Bili, TP); Future  -     CBC with platelets; Future  -     Uric acid; Future  -     CT Angiogram coronary artery; Future  -     Lipase; Future  -     metoprolol succinate ER (TOPROL XL) 25 MG 24 hr tablet; Take 1 tablet (25 mg) by mouth daily The night prior to CT angiogram in the day of CT angiogram  -     sucralfate (CARAFATE) 1 GM/10ML suspension; Take 10 mLs (1 g) by mouth 4 times daily Before meals and bedtime  -     Troponin I  -     BNP-N terminal pro  -     D dimer, quantitative  -     Comprehensive metabolic panel (BMP + Alb, Alk Phos, ALT, AST, Total. Bili, TP)  -     CBC with platelets  -     Uric acid  -     Lipase  -     nitroGLYcerin (NITROSTAT) sublingual tablet 0.4 mg  -     EKG 12-lead, tracing only    Shortness of breath  -     Troponin I; Future  -     BNP-N terminal pro; Future  -     D dimer, quantitative; Future  -     EKG 12-lead,  tracing only  -     Cancel: CT Angiogram coronary artery; Future  -     pantoprazole (PROTONIX) 40 MG EC tablet; Take 1 tablet (40 mg) by mouth daily  -     Comprehensive metabolic panel (BMP + Alb, Alk Phos, ALT, AST, Total. Bili, TP); Future  -     CBC with platelets; Future  -     Uric acid; Future  -     CT Angiogram coronary artery; Future  -     sucralfate (CARAFATE) 1 GM/10ML suspension; Take 10 mLs (1 g) by mouth 4 times daily Before meals and bedtime  -     Troponin I  -     BNP-N terminal pro  -     D dimer, quantitative  -     Comprehensive metabolic panel (BMP + Alb, Alk Phos, ALT, AST, Total. Bili, TP)  -     CBC with platelets  -     Uric acid  -     nitroGLYcerin (NITROSTAT) sublingual tablet 0.4 mg  -     EKG 12-lead, tracing only    Gastroesophageal reflux disease without esophagitis  -     pantoprazole (PROTONIX) 40 MG EC tablet; Take 1 tablet (40 mg) by mouth daily  -     sucralfate (CARAFATE) 1 GM/10ML suspension; Take 10 mLs (1 g) by mouth 4 times daily Before meals and bedtime    Abnormal blood chemistry  -     Comprehensive metabolic panel (BMP + Alb, Alk Phos, ALT, AST, Total. Bili, TP); Future  -     CBC with platelets; Future  -     Uric acid; Future  -     Comprehensive metabolic panel (BMP + Alb, Alk Phos, ALT, AST, Total. Bili, TP)  -     CBC with platelets  -     Uric acid    Pharyngitis, unspecified etiology  -     Streptococcus A Rapid Scr w Reflx to PCR - Lab Collect; Future  -     Streptococcus A Rapid Scr w Reflx to PCR - Lab Collect  -     Group A Streptococcus PCR Throat Swab    Other orders  -     REVIEW OF HEALTH MAINTENANCE PROTOCOL ORDERS        Yoan Gutierrez MD  Family Medicine   [facility] [id number] [phone]

## 2022-05-24 NOTE — ED PROVIDER NOTES
EMERGENCY DEPARTMENT ENCOUNTER      NAME: Roya Peres  AGE: 56 year old male  YOB: 1965  MRN: [id number]  EVALUATION DATE & TIME: 2022  4:08 PM    PCP: Yoan Gutierrez    ED PROVIDER: Rey Tucker MD        Chief Complaint   Patient presents with     Heartburn     Breathing Problem         FINAL IMPRESSION:  1. Gastroesophageal reflux disease with esophagitis, unspecified whether hemorrhage    2. Atypical chest pain          ED COURSE & MEDICAL DECISION MAKIN:34 PM I met with the patient, obtained history, performed an initial exam, and discussed options and plan for diagnostics and treatment here in the ED. PPE worn: face shield and N-95.   6:42 PM Reevaluated and updated patient. Patient reports symptoms have resolved after GI cocktail. We discussed the plan for discharge and the patient is agreeable. Reviewed supportive cares, symptomatic treatment, outpatient follow up, and reasons to return to the Emergency Department. Patient to be discharged by ED RN.     Pertinent Labs & Imaging studies reviewed. (See chart for details)    56 year old male presents to the Emergency Department for evaluation of retrosternal burning, sour backwash,, history of heartburn    For EKG, labs, imaging    At the conclusion of the encounter I discussed the results of all of the tests and the disposition. The questions were answered. The patient or family acknowledged understanding and was agreeable with the care plan.     ED Course as of 05/24/22 1911   Tue May 24, 2022   1840 Differential includes ACS, pulmonary emboli, pneumonia, aortic dissection, esophageal rupture, pneumothorax, pneumonia, malignancy, pleurisy, shingles, and GERD.  Based on history and examination pulmonary emboli and aortic dissection unlikely.      1840 Heart score 2   1840 Likely GERD however will have patient follow-up rapid access heart clinic    EKG x2 reassuring.  Troponin x2 reassuring.   184 Lipase: 35    WBC: 5.3  "  1840 Hemoglobin: 15.3   1840 Troponin I: <0.01   1840 Pancreatitis unlikely.  Aortic dissection unlikely     After GI cocktail symptoms resolved.  Likely GERD but cannot exclude heart disease therefore will have patient follow-up rapid access heart clinic.  Prescribed PPI earlier today by primary care doctor      MEDICATIONS GIVEN IN THE EMERGENCY:  Medications   lidocaine (viscous) (XYLOCAINE) 2 % 15 mL, alum & mag hydroxide-simethicone (MAALOX) 15 mL GI Cocktail (30 mLs Oral Given 5/24/22 1659)       NEW PRESCRIPTIONS STARTED AT TODAY'S ER VISIT  New Prescriptions    No medications on file          =================================================================    HPI    Triage note  \"  Patient coming from clinic. He has been having SOB and heartburn for 5-6 months. His provider gave him 1 nitroglycerin at the clinic which patient reports helped the burning in his chest a little bit.   \"      Patient information was obtained from: Patient     Use of : [employer]  - [employer].         Roya Peres is a 56 year old male with a pertinent history of GERD who presents to this ED via private auto for evaluation of chest pain.     Patient reports ongoing episodes of chest pain for the past 5-6 months (since ~January). He states it is a burning sensation that radiates from the upper abdomen to the throat. Symptoms are intermittent and random in nature. Activity does not seem to provoke symptoms. Currently, he endorses in weakness and increased heart rate, but denies symptoms of heartburn. He does report feeling lightheaded after being administered nitroglycerin by ED nurse.     Patient reports a history of high cholesterol but denies hypertension. He denies an ongoing history of heartburn. He notes a history of stomach ulcers but reports symptoms today are not similar. Patient regularly bikes and exercises and reports occasionally symptoms of shortness of breath when biking. Patient denies cough, " "fever, or any other complaints at this time.     Shx: Patient is a non smoker.       REVIEW OF SYSTEMS   Review of Systems   Constitutional: Negative for fever.   HENT: Negative for trouble swallowing.    Respiratory: Negative for cough.    Cardiovascular: Positive for chest pain (heartburn).   Gastrointestinal: Negative for abdominal pain.   Endocrine: Negative for polyuria.   Genitourinary: Negative for dysuria.   Musculoskeletal: Negative for back pain.   Skin: Negative for rash.   Allergic/Immunologic: Negative for immunocompromised state.   Neurological: Positive for weakness (generalized) and light-headedness.   Psychiatric/Behavioral: Negative for confusion.      PAST MEDICAL HISTORY:  Past Medical History:   Diagnosis Date     High cholesterol      Hypertriglyceridemia        PAST SURGICAL HISTORY:  History reviewed. No pertinent surgical history.        CURRENT MEDICATIONS:    metoprolol succinate ER (TOPROL XL) 25 MG 24 hr tablet  pantoprazole (PROTONIX) 40 MG EC tablet  sucralfate (CARAFATE) 1 GM/10ML suspension        ALLERGIES:  No Known Allergies    FAMILY HISTORY:  History reviewed. No pertinent family history.    SOCIAL HISTORY:   Social History     Socioeconomic History     Marital status:    Tobacco Use     Smoking status: Never Smoker     Smokeless tobacco: Never Used   Substance and Sexual Activity     Alcohol use: No     Drug use: No     Sexual activity: Yes     Partners: Female     Birth control/protection: None       VITALS:  /83   Pulse 80   Temp 98.7  F (37.1  C) (Temporal)   Resp (!) 31   Ht 1.727 m (5' 8\")   Wt 74.8 kg (165 lb)   SpO2 97%   BMI 25.09 kg/m      PHYSICAL EXAM      Vitals: /83   Pulse 80   Temp 98.7  F (37.1  C) (Temporal)   Resp (!) 31   Ht 1.727 m (5' 8\")   Wt 74.8 kg (165 lb)   SpO2 97%   BMI 25.09 kg/m    General: Appears in no acute distress, awake, alert, interactive.  Eyes: Conjunctivae non-injected. Sclera anicteric.  HENT: " Atraumatic.  Neck: Supple.  Respiratory/Chest: Respiration unlabored. No wheezing. No murmurs.   Abdomen: non distended, soft.   Musculoskeletal: Normal extremities. No edema or erythema.  Skin: Normal color. No rash or diaphoresis.  Neurologic: Face symmetric, moves all extremities spontaneously. Speech clear.  Psychiatric: Oriented to person, place, and time. Affect appropriate.       LAB:  All pertinent labs reviewed and interpreted.  Results for orders placed or performed during the hospital encounter of 05/24/22   Chest XR,  PA & LAT    Impression    IMPRESSION: Negative chest.   Basic metabolic panel   Result Value Ref Range    Sodium 138 136 - 145 mmol/L    Potassium 4.0 3.5 - 5.0 mmol/L    Chloride 105 98 - 107 mmol/L    Carbon Dioxide (CO2) 25 22 - 31 mmol/L    Anion Gap 8 5 - 18 mmol/L    Urea Nitrogen 18 8 - 22 mg/dL    Creatinine 0.91 0.70 - 1.30 mg/dL    Calcium 9.8 8.5 - 10.5 mg/dL    Glucose 110 70 - 125 mg/dL    GFR Estimate >90 >60 mL/min/1.73m2   Troponin I (now)   Result Value Ref Range    Troponin I <0.01 0.00 - 0.29 ng/mL   Hepatic function panel   Result Value Ref Range    Bilirubin Total 1.0 0.0 - 1.0 mg/dL    Bilirubin Direct 0.2 <=0.5 mg/dL    Protein Total 7.7 6.0 - 8.0 g/dL    Albumin 4.0 3.5 - 5.0 g/dL    Alkaline Phosphatase 99 45 - 120 U/L    AST 28 0 - 40 U/L    ALT 46 (H) 0 - 45 U/L   Result Value Ref Range    Lipase 35 0 - 52 U/L   Extra Red Top Tube   Result Value Ref Range    Hold Specimen JIC    Extra Green Top (Lithium Heparin) Tube   Result Value Ref Range    Hold Specimen JIC    Extra Purple Top Tube   Result Value Ref Range    Hold Specimen JIC    CBC with platelets and differential   Result Value Ref Range    WBC Count 5.3 4.0 - 11.0 10e3/uL    RBC Count 4.87 4.40 - 5.90 10e6/uL    Hemoglobin 15.3 13.3 - 17.7 g/dL    Hematocrit 43.8 40.0 - 53.0 %    MCV 90 78 - 100 fL    MCH 31.4 26.5 - 33.0 pg    MCHC 34.9 31.5 - 36.5 g/dL    RDW 12.1 10.0 - 15.0 %    Platelet Count 231 150  - 450 10e3/uL    % Neutrophils 50 %    % Lymphocytes 35 %    % Monocytes 11 %    % Eosinophils 3 %    % Basophils 1 %    % Immature Granulocytes 0 %    NRBCs per 100 WBC 0 <1 /100    Absolute Neutrophils 2.7 1.6 - 8.3 10e3/uL    Absolute Lymphocytes 1.8 0.8 - 5.3 10e3/uL    Absolute Monocytes 0.6 0.0 - 1.3 10e3/uL    Absolute Eosinophils 0.1 0.0 - 0.7 10e3/uL    Absolute Basophils 0.1 0.0 - 0.2 10e3/uL    Absolute Immature Granulocytes 0.0 <=0.4 10e3/uL    Absolute NRBCs 0.0 10e3/uL   Extra Blue Top Tube   Result Value Ref Range    Hold Specimen JIC    Result Value Ref Range    Troponin I <0.01 0.00 - 0.29 ng/mL       RADIOLOGY:  Reviewed all pertinent imaging. Please see official radiology report.  Chest XR,  PA & LAT   Final Result   IMPRESSION: Negative chest.          EKG:    Performed at: 15:2:45    Impression: Sinus rhythm. Normal ECG.     Rate: 84  Rhythm: Sinus  Axis: 58  NV Interval: 170  QRS Interval: 100  QTc Interval: 418  ST Changes: None  Comparison: When compared with ECG of 24-MAY-2022 13:55, no significant change was found.     I have independently reviewed and interpreted the EKG(s) documented above.    PROCEDURES:   None.       I, Judi Martinez, am serving as a scribe to document services personally performed by Theo Tucker MD based on my observation and the provider's statements to me. I, Dr. Theo Tucker, attest that Judi Martinez is acting in a scribe capacity, has observed my performance of the services and has documented them in accordance with my direction.    Theo Tucker MD  Emergency Medicine  [facility] EMERGENCY DEPARTMENT  [address] [facility] [id number] [phone]     Theo Tucker MD  05/24/22 6531

## 2022-05-24 NOTE — ASSESSMENT & PLAN NOTE
"5-6 months     Can't get a deep breath    Heartburn??    No other symptoms   \"like a heartburn sensation\"  Precipitate? Nothing     How Often? Daily   Cough?  No   Pressure with food? Yes    \"like food is stuck like a bubble\"   \" then it passes\"    Sweaty? No   Nauseated?  No   Bad taste in back mouth? No     Something in my lung    \"all of sudden hard to breathe and exhausted\"    Why now?   Any triggers?  Any make better?    "

## 2022-05-24 NOTE — ED NOTES
"    ED Provider In Triage Note  [facility]  Encounter Date: May 24, 2022    Chief Complaint   Patient presents with     Heartburn     Breathing Problem         Use of Intrepreter: N/A       Brief HPI:   Roya Peres is a 56 year old male presenting to the Emergency Department with a chief complaint of chest pain.    Chest pain that he calls \"heart burn\" substernal. Also feeling SOB. Sx began 5-6 months ago. Went to clinic eval today.    Sent in by PCP for CP that improved somewhat with nitroglycerin and noted to have an abnl EKG at clinic.    +CP, SOB, cough    3/10 chest pain currently.        Brief Physical Exam:  BP (!) 132/91   Pulse 104   Temp 98.7  F (37.1  C) (Temporal)   Resp 16   Ht 1.727 m (5' 8\")   Wt 74.8 kg (165 lb)   SpO2 96%   BMI 25.09 kg/m    General: Non-toxic appearing  HEENT: Atraumatic  Resp: No respiratory distress  Abdomen: Non-peritoneal  Neuro: Alert, oriented, answers questions appropriately  Psych: Behavior appropriate  Musculoskeletal: atraumatic      Plan Initiated in Triage:  Labs, CXR, EKG    PIT Dispo:   Place patient in the next available ED bed          Sharon Dowell MD on 5/24/2022 at 3:14 PM        Patient was evaluated by the Physician in Triage due to a limitation of available rooms in the Emergency Department. A plan of care was discussed based on the information obtained on the initial evaluation and patient was counseled to return back to the Emergency Department lobby after this initial evalutaiton until results were obtained or a room became available in the Emergency Department. Patient was counseled not to leave prior to receiving the results of their workup.            Sharon Dowell MD  05/24/22 1519    "

## 2022-05-24 NOTE — PATIENT INSTRUCTIONS
Thank you for coming in [facility]    We are going to check in your heart    I would like you to do an EKG today    Checking you for streptococcal infection    Checking your blood counts    The differential diagnosis includes coronary artery disease  Pulmonary embolus  Pancreatitis and other gallbladder problems  Peptic ulcer disease  Gastroesophageal reflux    Start pantoprazole 40 mg daily    Use Carafate 1 g before meals    After we get the results of the CT angiogram we will go from there  We will come in and check blood work    I like to see you next Monday early in the morning    If you have worsening shortness of breath worsening or worsening chest pain the neck step would be the emergency room    Patient's pain improved with nitroglycerin    56-year-old long gentleman EKG changes in inferior leads  Persistent discomfort in his chest on and off for the last 4 months  Given his risk factor  Like and go the emergency room  He declined an ambulance  He will go to [facility] we will apprise the emergency room crew that is coming      Kenny

## 2022-05-24 NOTE — DISCHARGE INSTRUCTIONS
Continue the medications that were prescribed to you by your primary care doctor today.  Follow-up with rapid access heart clinic

## 2022-05-24 NOTE — ED NOTES
Expected Patient Referral to ED  2:22 PM    Referring Clinic/Provider:  Dr. Matt Haro    Reason for referral/Clinical facts:  Chest pain. Got a dose of nitroglycerin and felt better. Didn't like the way the EKG looks    Recommendations provided:  Send to ED for further evaluation    Caller was informed that this institution does possess the capabilities and/or resources to provide for patient and should be transferred to our facility.      Kamala Romero MD  [facility] EMERGENCY DEPARTMENT  [address] [place] [id number] [phone]       Kamala Romero MD  05/24/22 1031

## 2022-05-24 NOTE — ED TRIAGE NOTES
Patient coming from clinic. He has been having SOB and heartburn for 5-6 months. His provider gave him 1 nitroglycerin at the clinic which patient reports helped the burning in his chest a little bit.

## 2022-06-03 ENCOUNTER — APPOINTMENT (OUTPATIENT)
Dept: INTERPRETER SERVICES | Facility: CLINIC | Age: 57
End: 2022-06-03
Payer: COMMERCIAL

## 2022-06-06 ENCOUNTER — HOSPITAL ENCOUNTER (OUTPATIENT)
Dept: CT IMAGING | Facility: CLINIC | Age: 57
Discharge: HOME | End: 2022-06-06
Attending: FAMILY MEDICINE | Admitting: FAMILY MEDICINE
Payer: COMMERCIAL

## 2022-06-06 VITALS — SYSTOLIC BLOOD PRESSURE: 116 MMHG | DIASTOLIC BLOOD PRESSURE: 71 MMHG

## 2022-06-06 DIAGNOSIS — R06.02 SHORTNESS OF BREATH: ICD-10-CM

## 2022-06-06 DIAGNOSIS — R07.9 CHEST PAIN, UNSPECIFIED TYPE: ICD-10-CM

## 2022-06-06 LAB — BSA FOR ECHO PROCEDURE: 0 M2

## 2022-06-06 PROCEDURE — 75574 CT ANGIO HRT W/3D IMAGE: CPT

## 2022-06-06 PROCEDURE — 250N000013 HC RX MED GY IP 250 OP 250 PS 637: Performed by: FAMILY MEDICINE

## 2022-06-06 PROCEDURE — 75574 CT ANGIO HRT W/3D IMAGE: CPT | Mod: 26 | Performed by: INTERNAL MEDICINE

## 2022-06-06 PROCEDURE — 250N000011 HC RX IP 250 OP 636: Performed by: FAMILY MEDICINE

## 2022-06-06 PROCEDURE — 250N000009 HC RX 250: Performed by: FAMILY MEDICINE

## 2022-06-06 RX ORDER — NITROGLYCERIN 0.4 MG/1
0.4 TABLET SUBLINGUAL ONCE
Status: COMPLETED | OUTPATIENT
Start: 2022-06-06 | End: 2022-06-06

## 2022-06-06 RX ORDER — IOPAMIDOL 755 MG/ML
100 INJECTION, SOLUTION INTRAVASCULAR ONCE
Status: COMPLETED | OUTPATIENT
Start: 2022-06-06 | End: 2022-06-06

## 2022-06-06 RX ORDER — METOPROLOL TARTRATE 1 MG/ML
5 INJECTION, SOLUTION INTRAVENOUS
Status: DISCONTINUED | OUTPATIENT
Start: 2022-06-06 | End: 2022-06-07 | Stop reason: HOSPADM

## 2022-06-06 RX ORDER — LIDOCAINE 40 MG/G
CREAM TOPICAL
Status: DISCONTINUED | OUTPATIENT
Start: 2022-06-06 | End: 2022-06-07 | Stop reason: HOSPADM

## 2022-06-06 RX ORDER — DILTIAZEM HYDROCHLORIDE 5 MG/ML
5 INJECTION INTRAVENOUS
Status: DISCONTINUED | OUTPATIENT
Start: 2022-06-06 | End: 2022-06-07 | Stop reason: HOSPADM

## 2022-06-06 RX ORDER — DILTIAZEM HYDROCHLORIDE 5 MG/ML
10 INJECTION INTRAVENOUS
Status: DISCONTINUED | OUTPATIENT
Start: 2022-06-06 | End: 2022-06-07 | Stop reason: HOSPADM

## 2022-06-06 RX ADMIN — IOPAMIDOL 100 ML: 755 INJECTION, SOLUTION INTRAVENOUS at 12:30

## 2022-06-06 RX ADMIN — NITROGLYCERIN 0.4 MG: 0.4 TABLET SUBLINGUAL at 12:20

## 2022-06-06 RX ADMIN — METOPROLOL TARTRATE 5 MG: 1 INJECTION, SOLUTION INTRAVENOUS at 12:24

## 2022-06-16 PROBLEM — Z91.89 AT RISK FOR CORONARY ARTERY DISEASE: Status: ACTIVE | Noted: 2022-06-16

## 2022-06-16 PROBLEM — I71.20 THORACIC AORTIC ANEURYSM WITHOUT RUPTURE (H): Status: ACTIVE | Noted: 2022-06-16
